# Patient Record
Sex: MALE | Race: OTHER | Employment: UNEMPLOYED | URBAN - METROPOLITAN AREA
[De-identification: names, ages, dates, MRNs, and addresses within clinical notes are randomized per-mention and may not be internally consistent; named-entity substitution may affect disease eponyms.]

---

## 2017-01-20 ENCOUNTER — OFFICE VISIT (OUTPATIENT)
Dept: FAMILY MEDICINE CLINIC | Age: 37
End: 2017-01-20

## 2017-01-20 VITALS
HEART RATE: 55 BPM | SYSTOLIC BLOOD PRESSURE: 127 MMHG | RESPIRATION RATE: 18 BRPM | WEIGHT: 212 LBS | TEMPERATURE: 97.9 F | DIASTOLIC BLOOD PRESSURE: 80 MMHG | OXYGEN SATURATION: 99 % | BODY MASS INDEX: 27.21 KG/M2 | HEIGHT: 74 IN

## 2017-01-20 DIAGNOSIS — A01.00 SALMONELLA TYPHI: ICD-10-CM

## 2017-01-20 DIAGNOSIS — R10.32 LLQ ABDOMINAL PAIN: Primary | ICD-10-CM

## 2017-01-20 DIAGNOSIS — E78.2 MIXED HYPERLIPIDEMIA: ICD-10-CM

## 2017-01-20 RX ORDER — CIPROFLOXACIN 500 MG/1
500 TABLET ORAL 2 TIMES DAILY
Qty: 20 TAB | Refills: 0 | Status: SHIPPED | OUTPATIENT
Start: 2017-01-20 | End: 2017-01-30

## 2017-01-20 RX ORDER — METRONIDAZOLE 500 MG/1
500 TABLET ORAL 3 TIMES DAILY
Qty: 30 TAB | Refills: 0 | Status: SHIPPED | OUTPATIENT
Start: 2017-01-20 | End: 2017-01-30

## 2017-01-20 NOTE — MR AVS SNAPSHOT
Visit Information Date & Time Provider Department Dept. Phone Encounter #  
 1/20/2017 10:30 AM Wayne Blanco NP [address] [facility] [phone] [id number] Follow-up Instructions Return in about 1 month (around 2/20/2017) for repeat labs and follow up. Upcoming Health Maintenance Date Due DTaP/Tdap/Td series (1 - Tdap) 8/25/1999 INFLUENZA AGE 9 TO ADULT 8/1/2016 Allergies as of 1/20/2017  Review Complete On: 1/20/2017 By: Wayne Blanco NP Severity Noted Reaction Type Reactions Codeine  04/15/2014    Nausea and Vomiting Current Immunizations  Never Reviewed No immunizations on file. Not reviewed this visit You Were Diagnosed With   
  
 Codes Comments LLQ abdominal pain    -  Primary ICD-10-CM: R10.32 
ICD-9-CM: 789.04 Vitals BP Pulse Temp Resp Height(growth percentile) Weight(growth percentile) 127/80 (BP 1 Location: Right arm, BP Patient Position: Sitting) (!) 55 97.9 °F (36.6 °C) (Oral) 18 6' 2\" (1.88 m) 212 lb (96.2 kg) SpO2 BMI Smoking Status 99% 27.22 kg/m2 Never Smoker Vitals History BMI and BSA Data Body Mass Index Body Surface Area  
 27.22 kg/m 2 2.24 m 2 Preferred Pharmacy Pharmacy Name Phone [facility] [phone] Your Updated Medication List  
  
   
This list is accurate as of: 1/20/17 11:11 AM.  Always use your most recent med list.  
  
  
  
  
 ciprofloxacin HCl 500 mg tablet Commonly known as:  CIPRO Take 1 Tab by mouth two (2) times a day for 10 days. metroNIDAZOLE 500 mg tablet Commonly known as:  FLAGYL Take 1 Tab by mouth three (3) times daily for 10 days. Prescriptions Sent to Pharmacy Refills  
 ciprofloxacin HCl (CIPRO) 500 mg tablet 0 Sig: Take 1 Tab by mouth two (2) times a day for 10 days. Class: Normal  
 Pharm[facility] [facility] [phone] Route: Oral  
 metroNIDAZOLE (FLAGYL) 500 mg tablet 0 Sig: Take 1 Tab by mouth three (3) times daily for 10 days. Class: Normal  
 Pharmacy: [facility] [address] [facility]: [phone] Route: Oral  
  
Follow-up Instructions Return in about 1 month (around 2/20/2017) for repeat labs and follow up. Patient Instructions Alimentación saludable para el corazón: Instrucciones de cuidado - [ Heart-Healthy Diet: Care Instructions ] Instrucciones de cuidado Chuckie alimentación saludable para el corazón incluye abundantes verduras, frutas, nueces, frijoles (habichuelas) y granos integrales, y es baja en sal. Limita los alimentos ricos en grasas saturadas, adan por ejemplo, mansoor, quesos y frituras. Podría resultarle difícil cambiar pires alimentación, carmen incluso los cambios pequeños pueden reducir el riesgo de ataque al corazón y enfermedades cardíacas. La atención de seguimiento es chuckie parte clave de pires tratamiento y seguridad. Asegúrese de hacer y acudir a todas las citas, y llame a pires médico si está teniendo problemas. También es chuckie buena idea saber los resultados de los exámenes y mantener chuckie lista de los medicamentos que esvin. Cómo puede cuidarse en el [facility]? [address] · Sepa qué es chuckie porción. Rosia Crest \"porción\" estándar no siempre tiene el mismo tamaño que la \"porción\" que generalmente usted come. Asegúrese de no comer porciones más grandes de las recomendadas. Por ejemplo, chuckie porción de pasta equivale a ½ taza. Chuckie porción de carne es de 2 a 3 onzas. Chuckie porción de 3 onzas es aproximadamente del tamaño de chuckie baraja de naipes.  Mida los tamaños de las porciones hasta que tenga práctica en medirlas Soosalu jony\". Tenga en cuenta que los restaurantes a menudo sirven 2 o 3 veces más cantidad que el tamaño de [place] Huger porción. · Para mantener un alto nivel de energía y no sentir hambre, coma con frecuencia carmen porciones más pequeñas. · Coma solo la cantidad de calorías que necesita para mantener un peso saludable. Si necesita bajar de [place], queme más calorías (mediante el ejercicio y otras actividades físicas) que las que consume. Consuma más frutas y verduras · Coma chuckie variedad de frutas y verduras todos los GRASSE. Hope Mar[place]opa y las verduras de color fer oscuro, naranja intenso, menezes o amarillo son particularmente buenas para usted. Lee Ann Tobin son espinacas, zanahorias, duraznos (melocotones) y bayas. · Tenga a mano zanahorias, apio y otras verduras para los refrigerios. Compre fruta de temporada y póngala donde la pueda anam para que se sienta tentado a comerla. · Prepare platos que tengan muchas verduras, adan verduras salteadas y sopas. [address] y [place]" · Moraima las etiquetas de los alimentos y trate de evitar las grasas saturadas y [place]". Aumentan el riesgo de enfermedad cardíaca. Wynelle Limb \"trans\" se encuentra en muchos alimentos procesados tales adan las galletas dulces y [place]. · Use aceite de caban o de canola (colza) al cocinar. Pruebe productos para untar que reducen [employer], adan Benecol o Take Control. · Prepare los alimentos al horno, a la lucille o al vapor, en lugar de freírlos. · Elija carne magra en vez de mansoor ricas en grasas adan los perros calientes (\"hot dogs\") y las salchichas. Al cocinar las mansoor, elimine toda la grasa visible. · Coma pescado, aves sin piel y alternativas de la carne adan productos de soya en lugar de mansoor ricas en grasas. Los productos de soya, adan el tofu, pueden ser especialmente buenos para Nayely Goldsmith. · [address] y productos lácteos bajos en grasa o sin grasa. Consuma pescado · Coma al CHILDREN'S \[facility] [place] [facility]\"" OF [place] porciones de pescado a la semana. Ciertos pescados, adan el salmón y el atún, contienen ácidos grasos omega-3, que podrían ayudar a reducir el riesgo de ataque al corazón. Coma alimentos ricos en fibra · Coma chuckie variedad de productos de granos todos los [address] Incluya alimentos de grano entero que contengan [place] y otros nutrientes. Algunos ejemplos de alimentos con granos enteros son bekah, pan integral y arroz integral. 
· Compre panes y cereales integrales en lugar de pan villafuerte o productos de pastelería. Limite la sal y [place] · Limite la cantidad de sal y sodio que consume para ayudar a reducir la presión arterial. 
· Pruebe la comida antes de ponerle sal. Agregue solo un poco de sal cuando le parezca que marc falta. Con el tiempo, ady papilas gustativas se acostumbrarán a menos sal. 
· Coma menos refrigerios, comida rápida y otros alimentos procesados ricos en sal. Revise la etiqueta de los alimentos para conocer la cantidad de sodio de los alimentos envasados. · Elija versiones bajas en sodio de alimentos enlatados (adan sopas, verduras y frijoles [habichuelas]). Limite el uso del azúcar · Limite bebidas y alimentos que contengan azúcar añadido. Entre estos se encuentran los [place], los postres y las sodas. Limite el alcohol · Limite el alcohol a no más de 2 bebidas al día para los hombres, y 1 bebida al día para las mujeres. Beber demasiado alcohol puede causarle problemas de [place]. Cuándo debe pedir ayuda? Preste especial atención a los cambios en pires vida y asegúrese de comunicarse con pires médico si: 
· Le gustaría recibir ayuda para planificar las comidas saludables para el corazón. Dónde puede encontrar más información en inglés? Ariel Camilo a [email or web address].info/. Escriba V137 en la búsqueda para aprender más acerca de \"Alimentación saludable para el corazón: Instrucciones de cuidado - [ Heart-Healthy Diet: Care Instructions ]. \" Revisado: 27 enero, 2016 Versión del contenido: 11.1 © 3781-4473 Healthwise, Incorporated. Las instrucciones de cuidado fueron adaptadas bajo licencia por [employer] (which disclaims liability or warranty for this information). Si usted tiene [place] afección médica o sobre estas instrucciones, siempre pregunte a pires profesional de vida. Healthwise, Incorporated niega toda garantía o responsabilidad por pires uso de esta información. Introducing [facility]! [employer] introduces Online Prasad patient portal. Now you can access parts of your medical record, email your doctor's office, and request medication refills online. 1. In your internet browser, go to [email or web address] 2. Click on the First Time User? Click Here link in the Sign In box. You will see the New Member Sign Up page. 3. Enter your Online Prasad Access Code exactly as it appears below. You will not need to use this code after youve completed the sign-up process. If you do not sign up before the expiration date, you must request a new code. · Online Prasad Access Code: [email or web address] Expires: 4/20/2017 10:49 AM 
 
4. Enter the last four digits of your Social Security Number (xxxx) and Date of Birth (mm/dd/yyyy) as indicated and click Submit. You will be taken to the next sign-up page. 5. Create a Online Prasad ID. This will be your Online Prasad login ID and cannot be changed, so think of one that is secure and easy to remember. 6. Create a Online Prasad password. You can change your password at any time. 7. Enter your Password Reset Question and Answer. This can be used at a later time if you forget your password. 8. Enter your e-mail address. You will receive e-mail notification when new information is available in [address] 9. Click Sign Up. You can now view and download portions of your medical record. 10. Click the Download Summary menu link to download a portable copy of your medical information. If you have questions, please visit the Frequently Asked Questions section of the [employer] website. Remember, [email or web address] is NOT to be used for urgent needs. For medical emergencies, dial 911. Now available from your iPhone and Android! Please provide this summary of care documentation to your next provider. Your primary care clinician is listed as Pati Guerra. If you have any questions after today's visit, please call [phone].

## 2017-01-20 NOTE — PATIENT INSTRUCTIONS
Alimentación saludable para el corazón: Instrucciones de cuidado - [ Heart-Healthy Diet: Care Instructions ]  Instrucciones de cuidado    Chuckie alimentación saludable para el corazón incluye abundantes verduras, frutas, nueces, frijoles (habichuelas) y granos integrales, y es baja en sal. Limita los alimentos ricos en grasas saturadas, adan por ejemplo, mansoor, quesos y frituras. Podría resultarle difícil cambiar pires alimentación, carmen incluso los cambios pequeños pueden reducir el riesgo de ataque al corazón y enfermedades cardíacas. La atención de seguimiento es chuckie parte clave de pires tratamiento y seguridad. Asegúrese de hacer y acudir a todas las citas, y llame a pires médico si está teniendo problemas. También es chuckie buena idea saber los resultados de los exámenes y mantener chuckie lista de los medicamentos que esvin. ¿Cómo puede cuidarse en el hogar? Controle las porciones  · Sepa qué es chuckie porción. [place] \"porción\" estándar no siempre tiene el mismo tamaño que la \"porción\" que generalmente usted come. Asegúrese de no comer porciones más grandes de las recomendadas. Por ejemplo, chuckie porción de pasta equivale a ½ taza. Chuckie porción de carne es de 2 a 3 onzas. Chuckie porción de 3 onzas es aproximadamente del tamaño de chuckie baraja de naipes. Mida los tamaños de las porciones hasta que tenga práctica en medirlas \"a jony\". Tenga en cuenta que los restaurantes a menudo sirven 2 o 3 veces más cantidad que el tamaño de [place] porción. · Para mantener un alto nivel de energía y no sentir hambre, coma con frecuencia carmen porciones más pequeñas. · Coma solo la cantidad de calorías que necesita para mantener un peso saludable. Si necesita bajar de [place], queme más calorías (mediante el ejercicio y otras actividades físicas) que las que consume. Consuma más frutas y verduras  · Coma chuckie variedad de frutas y verduras todos los belen.  Jaz Starch y las verduras de color fer oscuro, naranja intenso, menezes o amarillo son particularmente buenas para usted. Buhl Bogdan son espinacas, zanahorias, duraznos (melocotones) y bayas. · Tenga a mano zanahorias, apio y otras verduras para los refrigerios. Compre fruta de temporada y póngala donde la pueda anam para que se sienta tentado a comerla. · Prepare platos que tengan muchas verduras, adan verduras salteadas y sopas. Limite las grasas saturadas y \"trans\"  · [address] de los alimentos y trate de evitar las grasas saturadas y [place]". Aumentan el riesgo de enfermedad cardíaca. [place] Councilman \"trans\" se encuentra en muchos alimentos procesados tales adan las galletas dulces y [place]. · Use aceite de caban o de canola (colza) al cocinar. Pruebe productos para untar que reducen [employer], adan Benecol o Take Control. · Prepare los alimentos al horno, a la lucille o al vapor, en lugar de freírlos. · Elija carne magra en vez de mansoor ricas en grasas adan los perros calientes (\"hot dogs\") y las salchichas. Al cocinar las mansoor, elimine toda la grasa visible. · Coma pescado, aves sin piel y alternativas de la carne adan productos de soya en lugar de mansoor ricas en grasas. Los productos de soya, adan el tofu, pueden ser especialmente buenos para Gurmeet Bills. · Sisi Punches y productos lácteos bajos en grasa o sin grasa. Consuma pescado  · Coma al [facility] porciones de pescado a la semana. Ciertos pescados, adan el salmón y el atún, contienen ácidos grasos omega-3, que podrían ayudar a reducir el riesgo de ataque al corazón. Coma alimentos ricos en fibra  · Coma chuckie variedad de productos de granos todos los días. Incluya alimentos de grano entero que contengan [place] y otros nutrientes. Algunos ejemplos de alimentos con granos enteros son bekah, pan integral y arroz integral.  · Compre panes y cereales integrales en lugar de pan villafuerte o productos de pastelería.   Limite la sal y el sodio  · Limite la cantidad de sal y sodio que consume para ayudar a reducir la presión arterial.  · Pruebe la comida antes de ponerle sal. Agregue solo un poco de sal cuando le parezca que marc falta. Con el tiempo, ady papilas gustativas se acostumbrarán a menos sal.  · Coma menos refrigerios, comida rápida y otros alimentos procesados ricos en sal. Revise la etiqueta de los alimentos para conocer la cantidad de sodio de los alimentos envasados. · Elija versiones bajas en sodio de alimentos enlatados (adan sopas, verduras y frijoles [habichuelas]). Limite el uso del azúcar  · Limite bebidas y alimentos que contengan azúcar añadido. Entre estos se encuentran los [place], los postres y las sodas. Limite el alcohol  · Limite el alcohol a no más de 2 bebidas al día para los hombres, y 1 bebida al día para las mujeres. Beber demasiado alcohol puede causarle problemas de [place]. ¿Cuándo debe pedir ayuda? Preste especial atención a los cambios en pires vida y asegúrese de comunicarse con pires médico si:  · Le gustaría recibir ayuda para planificar las comidas saludables para el corazón. ¿Dónde puede encontrar más información en inglés? Linda Cast a [email or web address].info/. Escriba V137 en la búsqueda para aprender más acerca de \"Alimentación saludable para el corazón: Instrucciones de cuidado - [ Heart-Healthy Diet: Care Instructions ]. \"  Revisado: 27 enero, 2016  Versión del contenido: 11.1  © 5207-1775 [employer]. Las instrucciones de cuidado fueron adaptadas bajo licencia por [employer] (which disclaims liability or warranty for this information). Si usted tiene [place] afección médica o sobre estas instrucciones, siempre pregunte a pires profesional de vida. [facility] [employer] niega toda garantía o responsabilidad por pires uso de esta información.

## 2017-01-20 NOTE — PROGRESS NOTES
Chief Complaint   Patient presents with    Physical    Labs     Patient in office today for physical and fasting labs; Have concerns regarding trip from [address] that was taken one month ago; states have abdominal pain that has not resolved. 1. Have you been to the ER, urgent care clinic since your last visit? Hospitalized since your last visit? No    2. Have you seen or consulted any other health care providers outside of the [address] [place] [address] since your last visit? Include any pap smears or colon screening. No    Pt previously prescribed Linzess which helped relieve his constipation. Denies any constipation since then. Pt had hernia surgery about 3 years ago. Pain has been present since before having his hernia surgery. Denies any blood in the stool. Has stool culture results from [place] that suggest he could have a stool infection but [facility] [place] Comparison with research online suggests he could have salmonella typhi infection. Will scan results into media. Pt also had fasting labs while in [facility] and only abnormality was his cholesterol. Blood TGs were 577 fasting. Total cholesterol was 221. LDL 73.     Last colonoscopy was more than 5 years ago. Denies any other concerns at this time. Chief Complaint   Patient presents with   Lian Mancia     he is a 43y.o. year old male who presents for evalution. Reviewed PmHx, RxHx, FmHx, SocHx, AllgHx and updated and dated in the chart.     Review of Systems - negative except as listed above in the HPI    Objective:     Vitals:    01/20/17 1042   BP: 127/80   Pulse: (!) 55   Resp: 18   Temp: 97.9 °F (36.6 °C)   TempSrc: Oral   SpO2: 99%   Weight: 212 lb (96.2 kg)   Height: 6' 2\" (1.88 m)     Physical Examination: General appearance - alert, well appearing, and in no distress  Mental status - normal mood  Eyes - pupils equal and reactive, extraocular eye movements intact  Ears - bilateral TM's and external ear canals normal  Nose - normal and patent, no erythema, discharge or polyps and normal nontender sinuses  Mouth - mucous membranes moist, pharynx normal without lesions  Neck - supple, no significant adenopathy, carotids upstroke normal bilaterally, no bruits, thyroid exam: thyroid is normal in size without nodules or tenderness  Chest - clear to auscultation, no wheezes, rales or rhonchi, symmetric air entry  Heart - normal rate, regular rhythm, normal S1, S2, no murmurs  Abdomen - soft, non-distended, reports pain with deep palpation of the LLQ with no visible or palpable abnormality  Extremities - peripheral pulses normal, no ankle edema, no clubbing or cyanosis  Skin - normal coloration and turgor, no rashes, no suspicious skin lesions noted    Assessment/ Plan:   Soo Uriarte was seen today for physical and labs. Diagnoses and all orders for this visit:    LLQ abdominal pain / Salmonella Typhi  -     ciprofloxacin HCl (CIPRO) 500 mg tablet; Take 1 Tab by mouth two (2) times a day for 10 days. -     metroNIDAZOLE (FLAGYL) 500 mg tablet; Take 1 Tab by mouth three (3) times daily for 10 days. Start med regimen and take as prescribed. Reviewed SEs/ADSR of medication. Follow up in 1 month for reevaluation of sx. Consider repeating stool cultures. If sx persist or worsen pt will need to see GI for further evaluation. Mixed hyperlipidemia  Recommended pt start with making some diet and lifestyle changes provided with resources. Follow up in 1 month to repeat fasting labs. Discussed that if his TGs dont come down, will need to start new daily med. Follow-up Disposition:  Return in about 1 month (around 2/20/2017) for repeat labs and follow up. I have discussed the diagnosis with the patient and the intended plan as seen in the above orders. The patient has received an after-visit summary and questions were answered concerning future plans.      Medication Side Effects and Warnings were discussed with patient: yes  Patient Labs were reviewed and or requested: yes  Patient Past Records were reviewed and or requested  yes  Patient / Caregiver Understanding of treatment plan was verbalized during office visit YES    KANE Flores    Patient Instructions        Alimentación saludable para el corazón: Instrucciones de cuidado - [ Heart-Healthy Diet: Care Instructions ]  Instrucciones de cuidado    Chuckie alimentación saludable para el corazón incluye abundantes verduras, frutas, nueces, frijoles (habichuelas) y granos integrales, y es baja en sal. Limita los alimentos ricos en grasas saturadas, adan por ejemplo, mansoor, quesos y frituras. Podría resultarle difícil cambiar pires alimentación, carmen incluso los cambios pequeños pueden reducir el riesgo de ataque al corazón y enfermedades cardíacas. La atención de seguimiento es chuckie parte clave de pires tratamiento y seguridad. Asegúrese de hacer y acudir a todas las citas, y llame a pires médico si está teniendo problemas. También es chuckie buena idea saber los resultados de los exámenes y mantener chuckie lista de los medicamentos que esvin. ¿Cómo puede cuidarse en el hogar? Controle las porciones  · Sepa qué es chuckie porción. Go Riling \"porción\" estándar no siempre tiene el mismo tamaño que la \"porción\" que generalmente usted come. Asegúrese de no comer porciones más grandes de las recomendadas. Por ejemplo, chuckie porción de pasta equivale a ½ taza. Chuckie porción de carne es de 2 a 3 onzas. Chuckie porción de 3 onzas es aproximadamente del tamaño de chuckie baraja de naipes. Mida los tamaños de las porciones hasta que tenga práctica en medirlas \"a jony\". Tenga en cuenta que los restaurantes a menudo sirven 2 o 3 veces más cantidad que el tamaño de Manuelita Darcy porción. · Para mantener un alto nivel de energía y no sentir hambre, coma con frecuencia carmen porciones más pequeñas. · Coma solo la cantidad de calorías que necesita para mantener un peso saludable.  Si necesita bajar de [place], queme más calorías ([place] ejercicio y [address] físicas) que las que consume. Consuma más frutas y verduras  · Coma chuckie variedad de frutas y verduras todos los belen. Sophia Colonel y las verduras de color fer oscuro, naranja intenso, menezes o amarillo son particularmente buenas para usted. Woody [place] son espinacas, zanahorias, duraznos (melocotones) y bayas. · Tenga a mano zanahorias, apio y otras verduras para los refrigerios. Compre fruta de temporada y póngala donde la pueda anam para que se sienta tentado a comerla. · Prepare platos que tengan muchas verduras, adan verduras salteadas y sopas. Limite las grasas saturadas y \"trans\"  · [address] de los alimentos y trate de evitar las grasas saturadas y Moe". Aumentan el riesgo de enfermedad cardíaca. Ivory Brine \"trans\" se encuentra en muchos alimentos procesados tales adan las galletas dulces y [place]. · Use aceite de caban o de canola (colza) al cocinar. Pruebe productos para untar que reducen [employer], adan Benecol o Take Control. · Prepare los alimentos al horno, a la lucille o al vapor, en lugar de freírlos. · Elija carne magra en vez de mansoor ricas en grasas adan los perros calientes (\"hot dogs\") y las salchichas. Al cocinar las mansoor, elimine toda la grasa visible. · Coma pescado, aves sin piel y alternativas de la carne adan productos de soya en lugar de mansoor ricas en grasas. Los productos de soya, adan el tofu, pueden ser especialmente buenos para Carole Cord. · [address] y productos lácteos bajos en grasa o sin grasa. Consuma pescado  · Coma al [place] porciones de pescado a la semana. Ciertos pescados, adan el salmón y el atún, contienen ácidos grasos omega-3, que podrían ayudar a reducir el riesgo de ataque al corazón. Coma alimentos ricos en fibra  · Coma chuckie variedad de productos de granos todos los días. Incluya alimentos de grano entero que contengan [place] y otros nutrientes.  Bong abebe alimentos con granos enteros son bekah, pan integral y arroz integral.  · Compre panes y cereales integrales en lugar de pan villafuerte o productos de pastelería. Limite la sal y el sodio  · Limite la cantidad de sal y sodio que consume para ayudar a reducir la presión arterial.  · Pruebe la comida antes de ponerle sal. Agregue solo un poco de sal cuando le parezca que marc falta. Con el tiempo, ady papilas gustativas se acostumbrarán a menos sal.  · Coma menos refrigerios, comida rápida y otros alimentos procesados ricos en sal. Revise la etiqueta de los alimentos para conocer la cantidad de sodio de los alimentos envasados. · Elija versiones bajas en sodio de alimentos enlatados (adan sopas, verduras y frijoles [habichuelas]). Limite el uso del azúcar  · Limite bebidas y alimentos que contengan azúcar añadido. Entre estos se encuentran los [place], los postres y las sodas. Limite el alcohol  · Limite el alcohol a no más de 2 bebidas al día para los hombres, y 1 bebida al día para las mujeres. Beber demasiado alcohol puede causarle problemas de [place]. ¿Cuándo debe pedir ayuda? Preste especial atención a los cambios en pires viad y asegúrese de comunicarse con pires médico si:  · Le gustaría recibir ayuda para planificar las comidas saludables para el corazón. ¿Dónde puede encontrar más información en inglés? Saul Quintana a http://suma-huong.info/. Escriba V137 en la búsqueda para aprender más acerca de \"Alimentación saludable para el corazón: Instrucciones de cuidado - [ Heart-Healthy Diet: Care Instructions ]. \"  Revisado: 27 enero, 2016  Versión del contenido: 11.1  © 3618-9035 [employer], [employer] Las instrucciones de cuidado fueron adaptadas bajo licencia por [employer] (which disclaims liability or warranty for this information). Si usted tiene [place] afección médica o sobre estas instrucciones, siempre pregunte a pires profesional de vida.  [employer] niega toda garantía o responsabilidad por pires uso de esta información.

## 2017-01-20 NOTE — PROGRESS NOTES
Chief Complaint   Patient presents with   Lian Mancia     Patient in office today for physical and fasting labs; have concerns regarding trip from [address] that was taken one month ago;states have abdominal pain that has not resolved. 1. Have you been to the ER, urgent care clinic since your last visit? Hospitalized since your last visit? No    2. Have you seen or consulted any other health care providers outside of the [facility] [place] since your last visit? Include any pap smears or colon screening.  No

## 2017-02-20 ENCOUNTER — OFFICE VISIT (OUTPATIENT)
Dept: FAMILY MEDICINE CLINIC | Age: 37
End: 2017-02-20

## 2017-02-20 VITALS
SYSTOLIC BLOOD PRESSURE: 130 MMHG | BODY MASS INDEX: 26.31 KG/M2 | HEART RATE: 60 BPM | HEIGHT: 74 IN | OXYGEN SATURATION: 98 % | WEIGHT: 205 LBS | DIASTOLIC BLOOD PRESSURE: 84 MMHG | RESPIRATION RATE: 18 BRPM | TEMPERATURE: 98.6 F

## 2017-02-20 DIAGNOSIS — Z87.19 HISTORY OF INGUINAL HERNIA REPAIR: ICD-10-CM

## 2017-02-20 DIAGNOSIS — Z98.890 HISTORY OF INGUINAL HERNIA REPAIR: ICD-10-CM

## 2017-02-20 DIAGNOSIS — R10.32 LEFT INGUINAL PAIN: Primary | ICD-10-CM

## 2017-02-20 DIAGNOSIS — E78.2 MIXED HYPERLIPIDEMIA: ICD-10-CM

## 2017-02-20 NOTE — PROGRESS NOTES
Chief Complaint   Patient presents with    Abdominal Pain     Patient in office today for one month f/u of LLQ abdominal pain / Salmonella Typhi; have completed abx. States pain has not resolved. 1. Have you been to the ER, urgent care clinic since your last visit? Hospitalized since your last visit? No    2. Have you seen or consulted any other health care providers outside of the [address] [place] [address] since your last visit? Include any pap smears or colon screening. No    Pain comes and goes. Worse after working a lot. Hernia surgery was 3 years ago. Denies any relief of sx with completing the abx as prescribed. Pt has previously seen GI and urology. Both specialists told pt that things looked normal.     Denies any other concerns at this time. Chief Complaint   Patient presents with    Abdominal Pain     he is a 43y.o. year old male who presents for evalution. Reviewed PmHx, RxHx, FmHx, SocHx, AllgHx and updated and dated in the chart. Review of Systems - negative except as listed above in the HPI    Objective:     Vitals:    02/20/17 0936   BP: 130/84   Pulse: 60   Resp: 18   Temp: 98.6 °F (37 °C)   TempSrc: Oral   SpO2: 98%   Weight: 205 lb (93 kg)   Height: 6' 2\" (1.88 m)     Physical Examination: General appearance - alert, well appearing, and in no distress  Neck - carotids upstroke normal bilaterally, no bruits  Chest - clear to auscultation, no wheezes, rales or rhonchi, symmetric air entry  Heart - normal rate, regular rhythm, normal S1, S2, no murmurs    Assessment/ Plan:   Edson Campos was seen today for abdominal pain. Diagnoses and all orders for this visit:    Left inguinal pain / History of inguinal hernia repair  -     US SCROTUM/TESTICLES; Future  -     REFERRAL TO UROLOGY  US for further evaluation of sx. Will notify results and deviate plan based on findings. Enc pt to re-est care with urology for further evaluation of chronic sx.  Work up to date has been normal and sx were unrelieved by abx regimen. Mixed hyperlipidemia  -     LIPID PANEL  -     METABOLIC PANEL, COMPREHENSIVE  Will notify results and deviate plan based on findings. Enc to continue with efforts to follow a heart healthy diet. Follow-up Disposition:  Return if symptoms worsen or fail to improve. I have discussed the diagnosis with the patient and the intended plan as seen in the above orders. The patient has received an after-visit summary and questions were answered concerning future plans. Medication Side Effects and Warnings were discussed with patient: yes  Patient Labs were reviewed and or requested: yes  Patient Past Records were reviewed and or requested  yes  Patient / Caregiver Understanding of treatment plan was verbalized during office visit YES    KANE Parrish    There are no Patient Instructions on file for this visit.

## 2017-02-20 NOTE — PROGRESS NOTES
Chief Complaint   Patient presents with    Abdominal Pain     Patient in office today for one month f/u of LLQ abdominal pain / Salmonella Typhi; have completed abx. States pain has not resolved. 1. Have you been to the ER, urgent care clinic since your last visit? Hospitalized since your last visit? No    2. Have you seen or consulted any other health care providers outside of the [address] [place] [address] since your last visit? Include any pap smears or colon screening.  No

## 2017-02-20 NOTE — MR AVS SNAPSHOT
Visit Information Date & Time Provider Department Dept. Phone Encounter #  
 2/20/2017  9:30 AM Doris Davidson NP [facility] [phone] [id number] Follow-up Instructions Return if symptoms worsen or fail to improve. Upcoming Health Maintenance Date Due DTaP/Tdap/Td series (1 - Tdap) 8/25/1999 INFLUENZA AGE 9 TO ADULT 8/1/2016 Allergies as of 2/20/2017  Review Complete On: 2/20/2017 By: Doris Davidson NP Severity Noted Reaction Type Reactions Codeine  04/15/2014    Nausea and Vomiting Current Immunizations  Never Reviewed No immunizations on file. Not reviewed this visit You Were Diagnosed With   
  
 Codes Comments Left inguinal pain    -  Primary ICD-10-CM: R10.30 ICD-9-CM: 789.09 History of inguinal hernia repair     ICD-10-CM: Z98.890, Z87.19 ICD-9-CM: V15.29 Mixed hyperlipidemia     ICD-10-CM: E78.2 ICD-9-CM: 272.2 Vitals BP Pulse Temp Resp Height(growth percentile) Weight(growth percentile) 130/84 (BP 1 Location: Right arm, BP Patient Position: Sitting) 60 98.6 °F (37 °C) (Oral) 18 6' 2\" (1.88 m) 205 lb (93 kg) SpO2 BMI Smoking Status 98% 26.32 kg/m2 Never Smoker BMI and BSA Data Body Mass Index Body Surface Area  
 26.32 kg/m 2 2.2 m 2 Preferred Pharmacy Pharmacy Name Phone [address] [phone] Your Updated Medication List  
  
Notice  As of 2/20/2017 10:03 AM  
 You have not been prescribed any medications. We Performed the Following LIPID PANEL [11519 CPT(R)] METABOLIC PANEL, COMPREHENSIVE [30085 CPT(R)] REFERRAL TO UROLOGY [BHV655 Custom] Follow-up Instructions Return if symptoms worsen or fail to improve. To-Do List   
 02/21/2017 Imaging:  US SCROTUM/TESTICLES Referral Information Referral ID Referred By Referred To  
  
 [id number] Jeremias Sanchez MD   
   [address]   
   [place], [address] Phone: [phone] Fax: [phone] Visits Status Start Date End Date 1 New Request 2/20/17 2/20/18 If your referral has a status of pending review or denied, additional information will be sent to support the outcome of this decision. Introducing [facility]! Pola Riley introduces [employer] patient portal. Now you can access parts of your medical record, email your doctor's office, and request medication refills online. 1. In your internet browser, go to [email or web address] 2. Click on the First Time User? Click Here link in the Sign In box. You will see the New Member Sign Up page. 3. Enter your [employer] Access Code exactly as it appears below. You will not need to use this code after youve completed the sign-up process. If you do not sign up before the expiration date, you must request a new code. · [employer] Access Code: [email or web address] Expires: 4/20/2017 10:49 AM 
 
4. Enter the last four digits of your Social Security Number (xxxx) and Date of Birth (mm/dd/yyyy) as indicated and click Submit. You will be taken to the next sign-up page. 5. Create a [employer] ID. This will be your [employer] login ID and cannot be changed, so think of one that is secure and easy to remember. 6. Create a [employer] password. You can change your password at any time. 7. Enter your Password Reset Question and Answer. This can be used at a later time if you forget your password. 8. Enter your e-mail address. You will receive e-mail notification when new information is available in [address] 9. Click Sign Up. You can now view and download portions of your medical record. 10. Click the Download Summary menu link to download a portable copy of your medical information. If you have questions, please visit the Frequently Asked Questions section of the [employer] website. Remember, [email or web address] is NOT to be used for urgent needs. For medical emergencies, dial 911. Now available from your iPhone and Android! Please provide this summary of care documentation to your next provider. Your primary care clinician is listed as Naveen Llnaos. If you have any questions after today's visit, please call [phone].

## 2017-02-21 LAB
ALBUMIN SERPL-MCNC: 4.5 G/DL (ref 3.5–5.5)
ALBUMIN/GLOB SERPL: 2.1 {RATIO} (ref 1.1–2.5)
ALP SERPL-CCNC: 85 IU/L (ref 39–117)
ALT SERPL-CCNC: 33 IU/L (ref 0–44)
AST SERPL-CCNC: 25 IU/L (ref 0–40)
BILIRUB SERPL-MCNC: 0.4 MG/DL (ref 0–1.2)
BUN SERPL-MCNC: 17 MG/DL (ref 6–20)
BUN/CREAT SERPL: 22 (ref 8–19)
CALCIUM SERPL-MCNC: 9.1 MG/DL (ref 8.7–10.2)
CHLORIDE SERPL-SCNC: 103 MMOL/L (ref 96–106)
CHOLEST SERPL-MCNC: 159 MG/DL (ref 100–199)
CO2 SERPL-SCNC: 23 MMOL/L (ref 18–29)
CREAT SERPL-MCNC: 0.79 MG/DL (ref 0.76–1.27)
GLOBULIN SER CALC-MCNC: 2.1 G/DL (ref 1.5–4.5)
GLUCOSE SERPL-MCNC: 85 MG/DL (ref 65–99)
HDLC SERPL-MCNC: 33 MG/DL
INTERPRETATION, 910389: NORMAL
LDLC SERPL CALC-MCNC: 106 MG/DL (ref 0–99)
POTASSIUM SERPL-SCNC: 4.2 MMOL/L (ref 3.5–5.2)
PROT SERPL-MCNC: 6.6 G/DL (ref 6–8.5)
SODIUM SERPL-SCNC: 141 MMOL/L (ref 134–144)
TRIGL SERPL-MCNC: 99 MG/DL (ref 0–149)
VLDLC SERPL CALC-MCNC: 20 MG/DL (ref 5–40)

## 2017-02-21 NOTE — PROGRESS NOTES
Please notify pt the followin. Cholesterol looks great. Enc pt to keep following a heart healthy diet. Try eating a handful of almonds daily to bring up HDL. 2. Normal glucose, kidney and liver function. All other labs are normal. I recommend we repeat fasting labs in 1 year.

## 2017-02-22 ENCOUNTER — HOSPITAL ENCOUNTER (OUTPATIENT)
Dept: ULTRASOUND IMAGING | Age: 37
Discharge: HOME | End: 2017-02-22
Payer: COMMERCIAL

## 2017-02-22 DIAGNOSIS — R10.32 LEFT INGUINAL PAIN: ICD-10-CM

## 2017-02-22 PROCEDURE — 76870 US EXAM SCROTUM: CPT

## 2017-02-23 NOTE — PROGRESS NOTES
Please notify pt that US is normal and shows evidence of inguinal hernia. I recommend he follow up with urology if sx persist or worsen.

## 2017-02-26 ENCOUNTER — APPOINTMENT (OUTPATIENT)
Dept: GENERAL RADIOLOGY | Age: 37
End: 2017-02-26
Attending: EMERGENCY MEDICINE
Payer: COMMERCIAL

## 2017-02-26 ENCOUNTER — HOSPITAL ENCOUNTER (EMERGENCY)
Age: 37
Discharge: HOME | End: 2017-02-26
Attending: EMERGENCY MEDICINE
Payer: COMMERCIAL

## 2017-02-26 VITALS
RESPIRATION RATE: 16 BRPM | OXYGEN SATURATION: 97 % | SYSTOLIC BLOOD PRESSURE: 121 MMHG | TEMPERATURE: 98.6 F | WEIGHT: 205 LBS | HEIGHT: 74 IN | BODY MASS INDEX: 26.31 KG/M2 | HEART RATE: 58 BPM | DIASTOLIC BLOOD PRESSURE: 74 MMHG

## 2017-02-26 DIAGNOSIS — M54.50 ACUTE LEFT-SIDED LOW BACK PAIN WITHOUT SCIATICA: Primary | ICD-10-CM

## 2017-02-26 LAB
APPEARANCE UR: ABNORMAL
BACTERIA URNS QL MICRO: NEGATIVE /HPF
BILIRUB UR QL: NEGATIVE
COLOR UR: ABNORMAL
EPITH CASTS URNS QL MICRO: ABNORMAL /LPF
GLUCOSE UR STRIP.AUTO-MCNC: NEGATIVE MG/DL
HGB UR QL STRIP: NEGATIVE
HYALINE CASTS URNS QL MICRO: ABNORMAL /LPF (ref 0–5)
KETONES UR QL STRIP.AUTO: NEGATIVE MG/DL
LEUKOCYTE ESTERASE UR QL STRIP.AUTO: NEGATIVE
NITRITE UR QL STRIP.AUTO: NEGATIVE
PH UR STRIP: 7 [PH] (ref 5–8)
PROT UR STRIP-MCNC: NEGATIVE MG/DL
RBC #/AREA URNS HPF: ABNORMAL /HPF (ref 0–5)
SP GR UR REFRACTOMETRY: 1.02 (ref 1–1.03)
UROBILINOGEN UR QL STRIP.AUTO: 0.2 EU/DL (ref 0.2–1)
WBC URNS QL MICRO: ABNORMAL /HPF (ref 0–4)

## 2017-02-26 PROCEDURE — 81001 URINALYSIS AUTO W/SCOPE: CPT | Performed by: NURSE PRACTITIONER

## 2017-02-26 PROCEDURE — 72100 X-RAY EXAM L-S SPINE 2/3 VWS: CPT

## 2017-02-26 PROCEDURE — 99283 EMERGENCY DEPT VISIT LOW MDM: CPT

## 2017-02-26 RX ORDER — CYCLOBENZAPRINE HCL 10 MG
10 TABLET ORAL
Qty: 15 TAB | Refills: 0 | Status: SHIPPED | OUTPATIENT
Start: 2017-02-26 | End: 2017-04-06

## 2017-02-26 RX ORDER — IBUPROFEN 800 MG/1
800 TABLET ORAL
Qty: 15 TAB | Refills: 0 | Status: SHIPPED | OUTPATIENT
Start: 2017-02-26 | End: 2017-04-06

## 2017-02-26 NOTE — ED NOTES
Discharge instructions given to patient by MD and nurse. Pt has been given counseling on medication use and verbalizes understanding. Pt ambulated off of unit in no signs of distress.

## 2017-02-27 NOTE — ED PROVIDER NOTES
HPI Comments: 42 yo M with hx of GERD, hernia presents ambulatory to the ED with c/o L lower back pain since Friday. 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 Denies radiation of pain , loss of control of bowel or bladder, weakness, numbness or tingling of lower extremities. The pt reports increase in urination but denies dysuria or hematuria. Denies fever, chills, nausea, vomiting or diarrhea, CP or SOB. Tx PTA includes Tylenol. Past Medical History:  No date: GERD (gastroesophageal reflux disease)      Comment: rarely    Social History    Marital status:              Spouse name:                       Years of education:                 Number of children:               Occupational History    None on file    Social History Main Topics    Smoking status: Never Smoker                                                                Smokeless status: Never Used                        Alcohol use: No              Drug use: No              Sexual activity: Yes                  Other Topics            Concern    None on file    Social History Narrative    None on file          Patient is a 43 y.o. male presenting with back pain. Back Pain    Pertinent negatives include no chest pain, no fever, no numbness, no headaches, no abdominal pain, no dysuria and no weakness. Past Medical History:   Diagnosis Date    GERD (gastroesophageal reflux disease)     rarely       Past Surgical History:   Procedure Laterality Date    HX HERNIA REPAIR      HX ORTHOPAEDIC Right 2000    right wrist surgery         History reviewed. No pertinent family history. Social History     Social History    Marital status:      Spouse name: N/A    Number of children: N/A    Years of education: N/A     Occupational History    Not on file.      Social History Main Topics    Smoking status: Never Smoker    Smokeless tobacco: Never Used    Alcohol use No    Drug use: No    Sexual activity: Yes     Other Topics Concern    Not on file     Social History Narrative         ALLERGIES: Codeine    Review of Systems   Constitutional: Negative for activity change, appetite change, chills and fever. HENT: Negative for ear discharge and facial swelling. Eyes: Negative for discharge and redness. Respiratory: Negative for chest tightness, shortness of breath and wheezing. Cardiovascular: Negative for chest pain, palpitations and leg swelling. Gastrointestinal: Negative for abdominal pain, diarrhea, nausea, rectal pain and vomiting. Genitourinary: Positive for frequency. Negative for difficulty urinating, dysuria, hematuria and urgency. Musculoskeletal: Positive for back pain. Negative for joint swelling, neck pain and neck stiffness. Skin: Negative for rash. Neurological: Negative for dizziness, seizures, speech difficulty, weakness, light-headedness, numbness and headaches. Psychiatric/Behavioral: Negative for confusion. Vitals:    02/26/17 1226 02/26/17 1522   BP: (!) 149/99 121/74   Pulse: 65 (!) 58   Resp: 16 16   Temp: 98.4 °F (36.9 °C) 98.6 °F (37 °C)   SpO2: 99% 97%   Weight: 93 kg (205 lb)    Height: 6' 2\" (1.88 m)             Physical Exam   Constitutional: He is oriented to person, place, and time. He appears well-developed and well-nourished. No distress. HENT:   Head: Normocephalic and atraumatic. Eyes: Conjunctivae and EOM are normal. Pupils are equal, round, and reactive to light. Neck: Normal range of motion. Neck supple. Cardiovascular: Normal rate, regular rhythm, normal heart sounds and intact distal pulses. Pulmonary/Chest: Effort normal and breath sounds normal. No accessory muscle usage. No tachypnea. He has no decreased breath sounds. He has no wheezes. B breath sounds CTA. No evidence of SOB. Abdominal: Soft. Bowel sounds are normal. He exhibits no distension and no mass. There is no hepatosplenomegaly. There is no tenderness. There is no rigidity, no rebound, no guarding and no CVA tenderness. Abdomen soft, nontender with active BS throughout. No rigidity, masses or guarding. No flank or CVA discomfort. Genitourinary:   Genitourinary Comments: Urinary frequency. Denies hematuria, dysuria. Musculoskeletal: Normal range of motion. He exhibits tenderness. He exhibits no deformity. Tenderness to palpation of L lower lumbar region with no tenderness over SI joint. No numbness, weakness or paresthesias. 5/5 strength of lower extremities, +2 DP B. No decrease in sensation or ROM. No TLS spinous process tenderness or deformity. Neurological: He is alert and oriented to person, place, and time. He has normal strength. He displays no atrophy. No cranial nerve deficit or sensory deficit. He exhibits normal muscle tone. Coordination and gait normal. GCS eye subscore is 4. GCS verbal subscore is 5. GCS motor subscore is 6. Skin: Skin is warm and dry. Psychiatric: He has a normal mood and affect. His behavior is normal. Judgment and thought content normal.   Nursing note and vitals reviewed. MDM  Number of Diagnoses or Management Options  Acute left-sided low back pain without sciatica:   Diagnosis management comments: 42 yo M,  with L sided LBP since Friday. + urinary frequency. Denies radiation of pain. No numbness, tingling or weakness. LS spine Xray and UA ordered. Xray and UA reassuring. Will discharge pt with medication for pain, spasm and recommend FU with PCP, provider information given at the time of discharge.          Amount and/or Complexity of Data Reviewed  Clinical lab tests: ordered and reviewed  Tests in the radiology section of CPT®: ordered and reviewed    Patient Progress  Patient progress: stable    ED Course       Procedures

## 2017-03-18 NOTE — ED TRIAGE NOTES
Pt reports left lower back pain that began this past Friday. Pt denies fall or injury. Pt works construction.
- - -

## 2017-04-06 ENCOUNTER — OFFICE VISIT (OUTPATIENT)
Dept: FAMILY MEDICINE CLINIC | Age: 37
End: 2017-04-06

## 2017-04-06 VITALS
RESPIRATION RATE: 18 BRPM | WEIGHT: 206 LBS | TEMPERATURE: 98 F | HEART RATE: 72 BPM | BODY MASS INDEX: 26.44 KG/M2 | HEIGHT: 74 IN | OXYGEN SATURATION: 98 % | DIASTOLIC BLOOD PRESSURE: 74 MMHG | SYSTOLIC BLOOD PRESSURE: 108 MMHG

## 2017-04-06 DIAGNOSIS — K58.9 IRRITABLE BOWEL SYNDROME, UNSPECIFIED TYPE: ICD-10-CM

## 2017-04-06 DIAGNOSIS — M54.5 ACUTE LOW BACK PAIN, UNSPECIFIED BACK PAIN LATERALITY, WITH SCIATICA PRESENCE UNSPECIFIED: Primary | ICD-10-CM

## 2017-04-06 DIAGNOSIS — R10.9 CHRONIC ABDOMINAL PAIN: ICD-10-CM

## 2017-04-06 DIAGNOSIS — G89.29 CHRONIC ABDOMINAL PAIN: ICD-10-CM

## 2017-04-06 RX ORDER — DICLOFENAC SODIUM 75 MG/1
75 TABLET, DELAYED RELEASE ORAL 2 TIMES DAILY
Qty: 60 TAB | Refills: 2 | Status: SHIPPED | OUTPATIENT
Start: 2017-04-06 | End: 2017-10-30

## 2017-04-06 NOTE — PROGRESS NOTES
Chief Complaint   Patient presents with   [facility] Follow Up     Patient in office today for hosp f/u; pt was seen at [facility] due to back pain that has gotten worse in the past month; pt states he has f/u with chiropractor and was told to use ice compresses. 1. Have you been to the ER, urgent care clinic since your last visit? Hospitalized since your last visit?see chart    2. Have you seen or consulted any other health care providers outside of the [address] [place] [address] since your last visit? Include any pap smears or colon screening.  No

## 2017-04-06 NOTE — PROGRESS NOTES
Chief Complaint   Patient presents with   [facility] Follow Up     Patient in office today for hosp f/u; pt was seen at [facility] due to back pain that has gotten worse in the past month; pt states he has f/u with chiropractor and was told to use ice compresses. 1. Have you been to the ER, urgent care clinic since your last visit? Hospitalized since your last visit?see chart    2. Have you seen or consulted any other health care providers outside of the [employer] since your last visit? Include any pap smears or colon screening. No    Pts xray done during ED visit was normal.   Pt was prescribed flexeril and 800 mg motrin for pain. Didn't seem to help the pain. Pt noticed back pain after getting up out of work one morning. Noticed while driving to work that the pain worsened and spread down the left leg. Had a hard time working that day. Tried to work through it but was not able. Denies any history of trauma or injury prior to pain starting. Denies any history of back pain or problems. Still c/o pain at times. Not as severe. More problematic when he starts driving and from being in that position. Denies any OTCs. Pt has been seeing a chiropractor. Urology did a CT of his abdomen that showed 2 bulging discs in the low back. Still having pain with defecation and having a hard time going to the bathroom. Denies any blood in the stool. Only itching after he stools. Denies any other concerns at this time. Chief Complaint   Patient presents with   [facility] Follow Up     he is a 43y.o. year old male who presents for evalution. Reviewed PmHx, RxHx, FmHx, SocHx, AllgHx and updated and dated in the chart.     Review of Systems - negative except as listed above in the HPI    Objective:     Vitals:    04/06/17 1520   BP: 108/74   Pulse: 72   Resp: 18   Temp: 98 °F (36.7 °C)   TempSrc: Oral   SpO2: 98%   Weight: 206 lb (93.4 kg)   Height: 6' 2\" (1.88 m)     Physical Examination: General appearance - alert, well appearing, and in no distress  Eyes - pupils equal and reactive, extraocular eye movements intact  Ears - bilateral TM's and external ear canals normal  Nose - normal and patent, no erythema, discharge or polyps and normal nontender sinuses  Mouth - mucous membranes moist, pharynx normal without lesions  Neck - supple, no significant adenopathy  Chest - clear to auscultation, no wheezes, rales or rhonchi, symmetric air entry  Heart - normal rate, regular rhythm, normal S1, S2, no murmurs  Abdomen - tenderness noted in LLQ of abdomen with no visible or palpable abnormality present  bowel sounds normal  no CVA tenderness  Back exam - full range of motion, no tenderness, palpable spasm or pain on motion, negative straight-leg raise bilaterally, normal reflexes and strength bilateral lower extremities    Assessment/ Plan:   Author Ruben was seen today for hospital follow up. Diagnoses and all orders for this visit:    Acute low back pain, unspecified back pain laterality, with sciatica presence unspecified  -     Cancel: AMB POC URINALYSIS DIP STICK AUTO W/O MICRO  -     diclofenac EC (VOLTAREN) 75 mg EC tablet; Take 1 Tab by mouth two (2) times a day. Start diclofenac daily. Reviewed SEs/ADRs of medication. Enc to alternate heat and ice as needed for inflammation. Massage painful area to relieve muscle tension. OTC NSAID for pain/inflammation. Work on good body mechanics, avoid heavy lifting. RTC if sx persist or worsen. Chronic abdominal pain / Irritable bowel syndrome, unspecified type  -     REFERRAL TO GASTROENTEROLOGY  Persistent complaint unrelieved by abx prescribed in the past and other treatment measures. Enc pt to est care with GI for further evaluation. Follow-up Disposition:  Return if symptoms worsen or fail to improve. I have discussed the diagnosis with the patient and the intended plan as seen in the above orders.   The patient has received an after-visit summary and questions were answered concerning future plans. Medication Side Effects and Warnings were discussed with patient: yes  Patient Labs were reviewed and or requested: yes  Patient Past Records were reviewed and or requested  yes  Patient / Caregiver Understanding of treatment plan was verbalized during office visit YES    KANE Corey    There are no Patient Instructions on file for this visit.

## 2017-04-06 NOTE — MR AVS SNAPSHOT
Visit Information Date & Time Provider Department Dept. Phone Encounter #  
 4/6/2017  3:15 PM Pedro Luis Graf NP [facility] [phone] [id number] Follow-up Instructions Return if symptoms worsen or fail to improve. Upcoming Health Maintenance Date Due DTaP/Tdap/Td series (2 - Td) 4/6/2027 Allergies as of 4/6/2017  Review Complete On: 4/6/2017 By: Pedro Luis Graf NP Severity Noted Reaction Type Reactions Codeine  04/15/2014    Nausea and Vomiting Current Immunizations  Never Reviewed No immunizations on file. Not reviewed this visit You Were Diagnosed With   
  
 Codes Comments Acute low back pain, unspecified back pain laterality, with sciatica presence unspecified    -  Primary ICD-10-CM: M54.5 ICD-9-CM: 724.2 Chronic abdominal pain     ICD-10-CM: R10.9, G89.29 ICD-9-CM: 789.00, 338.29 Irritable bowel syndrome, unspecified type     ICD-10-CM: K58.9 ICD-9-CM: 318.6 Vitals BP Pulse Temp Resp Height(growth percentile) Weight(growth percentile) 108/74 (BP 1 Location: Right arm, BP Patient Position: Sitting) 72 98 °F (36.7 °C) (Oral) 18 6' 2\" (1.88 m) 206 lb (93.4 kg) SpO2 BMI Smoking Status 98% 26.45 kg/m2 Never Smoker Vitals History BMI and BSA Data Body Mass Index Body Surface Area  
 26.45 kg/m 2 2.21 m 2 Preferred Pharmacy Pharmacy Name Phone [address] [phone] Your Updated Medication List  
  
   
This list is accurate as of: 4/6/17  3:48 PM.  Always use your most recent med list.  
  
  
  
  
 diclofenac EC 75 mg EC tablet Commonly known as:  VOLTAREN Take 1 Tab by mouth two (2) times a day. Prescriptions Sent to Pharmacy  Refills  
 diclofenac EC (VOLTAREN) 75 mg EC tablet 2  
 Sig: Take 1 Tab by mouth two (2) times a day. Class: Normal  
 Pharmacy: [facility] [address] [facility] VALORIE Morel  #: [phone] Route: Oral  
  
We Performed the Following AMB POC URINALYSIS DIP STICK AUTO W/O MICRO [18012 CPT(R)] REFERRAL TO GASTROENTEROLOGY [ACR16 Custom] Follow-up Instructions Return if symptoms worsen or fail to improve. Referral Information Referral ID Referred By Referred To  
  
 [id number] Sana Larose [address] [facility], [place]   
   [address] [place] [address] [address] [place] [address] Phone: [phone] Fax: [phone] Visits Status Start Date End Date 1 New Request 4/6/17 4/6/18 If your referral has a status of pending review or denied, additional information will be sent to support the outcome of this decision. Introducing [facility] [place] & [facility]! [facility] introduces [employer] patient portal. Now you can access parts of your medical record, email your doctor's office, and request medication refills online. 1. In your internet browser, go to [email or web address] 2. Click on the First Time User? Click Here link in the Sign In box. You will see the New Member Sign Up page. 3. Enter your [employer] Access Code exactly as it appears below. You will not need to use this code after youve completed the sign-up process. If you do not sign up before the expiration date, you must request a new code. · [employer] Access Code: [email or web address] Expires: 4/20/2017 11:49 AM 
 
4. Enter the last four digits of your Social Security Number (xxxx) and Date of Birth (mm/dd/yyyy) as indicated and click Submit. You will be taken to the next sign-up page. 5. Create a [employer] E[employer] ID. This will be your [employer] login ID and cannot be changed, so think of one that is secure and easy to remember. 6. Create a Ali password. You can change your password at any time. 7. Enter your Password Reset Question and Answer. This can be used at a later time if you forget your password. 8. Enter your e-mail address. You will receive e-mail notification when new information is available in [address] 9. Click Sign Up. You can now view and download portions of your medical record. 10. Click the Download Summary menu link to download a portable copy of your medical information. If you have questions, please visit the Frequently Asked Questions section of the Ali website. Remember, Ali is NOT to be used for urgent needs. For medical emergencies, dial 911. Now available from your iPhone and Android! Please provide this summary of care documentation to your next provider. Your primary care clinician is listed as Karan Elizabeth. If you have any questions after today's visit, please call [phone].

## 2017-10-30 ENCOUNTER — OFFICE VISIT (OUTPATIENT)
Dept: FAMILY MEDICINE CLINIC | Age: 37
End: 2017-10-30

## 2017-10-30 VITALS
SYSTOLIC BLOOD PRESSURE: 129 MMHG | BODY MASS INDEX: 27.85 KG/M2 | WEIGHT: 217 LBS | RESPIRATION RATE: 18 BRPM | TEMPERATURE: 98.6 F | DIASTOLIC BLOOD PRESSURE: 90 MMHG | HEART RATE: 60 BPM | HEIGHT: 74 IN | OXYGEN SATURATION: 98 %

## 2017-10-30 DIAGNOSIS — R10.9 CHRONIC ABDOMINAL PAIN: Primary | ICD-10-CM

## 2017-10-30 DIAGNOSIS — G89.29 CHRONIC ABDOMINAL PAIN: Primary | ICD-10-CM

## 2017-10-30 DIAGNOSIS — Z13.29 SCREENING FOR THYROID DISORDER: ICD-10-CM

## 2017-10-30 DIAGNOSIS — Z13.220 LIPID SCREENING: ICD-10-CM

## 2017-10-30 NOTE — PROGRESS NOTES
Chief Complaint   Patient presents with    Abdominal Pain     Patient in office today for f/u on abdominal pain that has not improved since lov. Pt states he did follow up with gi specialist all labs and test results were normal.    1. Have you been to the ER, urgent care clinic since your last visit? Hospitalized since your last visit? No    2. Have you seen or consulted any other health care providers outside of the [address] [place] [address] since your last visit? Include any pap smears or colon screening.  No

## 2017-10-30 NOTE — PROGRESS NOTES
Chief Complaint   Patient presents with    Abdominal Pain     Patient in office today for f/u on abdominal pain that has not improved since lov. Pt states he did follow up with gi specialist all labs and test results were normal.    1. Have you been to the ER, urgent care clinic since your last visit? Hospitalized since your last visit? No    2. Have you seen or consulted any other health care providers outside of the [address] [place] [address] since your last visit? Include any pap smears or colon screening. No    Pt saw urology Dr. Jeniffer Ashton for this problem in the Spring. They re-evaluated his left inguinal hernia and found that there was no recurrence following repair surgery in 2015. There was a right kidney stone seen but no treatment was indicated. Pt saw Dr. Juli Hernandez in the summer for his sx. Had previously had normal colonoscopy and EGD for abd pain. H pylori testing was negative. Food allergy and other labs done at [facility] have been normal.  Dr. Lenna Canavan ordered repeat colonoscopy, normal.   CT of abdomen and pelvis was also normal.   Has not followed up since. Continues to have pain in the left lower abdomen after eating and at night. Associated reflux and heartburn. Pain has been the same since initial presentation. Bothers him daily multiple times a day. Not currently taking anything OTC for his sx. Denies any other concerns at this time. Chief Complaint   Patient presents with    Abdominal Pain     he is a 42y.o. year old male who presents for evalution. Reviewed PmHx, RxHx, FmHx, SocHx, AllgHx and updated and dated in the chart.     Review of Systems - negative except as listed above in the HPI    Objective:     Vitals:    10/30/17 1118   BP: 129/90   Pulse: 60   Resp: 18   Temp: 98.6 °F (37 °C)   TempSrc: Oral   SpO2: 98%   Weight: 217 lb (98.4 kg)   Height: 6' 2\" (1.88 m)     Physical Examination: General appearance - alert, well appearing, and in no distress  Eyes - pupils equal and reactive, extraocular eye movements intact  Ears - bilateral TM's and external ear canals normal  Nose - normal and patent, no erythema, discharge or polyps and normal nontender sinuses  Mouth - mucous membranes moist, pharynx normal without lesions  Neck - supple, no significant adenopathy  Chest - clear to auscultation, no wheezes, rales or rhonchi, symmetric air entry  Heart - normal rate, regular rhythm, normal S1, S2, no murmurs  Abdomen - soft, tenderness noted in LLQ of abdomen with no visible or palpable abnormality on exam, nondistended, no masses or organomegaly  bowel sounds normal  Extremities - peripheral pulses normal, no ankle edema, no clubbing or cyanosis  Skin - normal coloration and turgor, no rashes, no suspicious skin lesions noted    Assessment/ Plan:   Diagnoses and all orders for this visit:    1. Chronic abdominal pain  -     METABOLIC PANEL, COMPREHENSIVE  -     CBC WITH AUTOMATED DIFF  Enc pt to follow up with Dr. Cristiana Hernández for further evaluation due to persistent sx. 2. Lipid screening  -     LIPID PANEL  Will notify results and deviate plan based on findings. 3. Screening for thyroid disorder  -     TSH 3RD GENERATION  Screening, asx. Follow-up Disposition:  Return if symptoms worsen or fail to improve. I have discussed the diagnosis with the patient and the intended plan as seen in the above orders. The patient has received an after-visit summary and questions were answered concerning future plans.      Medication Side Effects and Warnings were discussed with patient: yes  Patient Labs were reviewed and or requested: yes  Patient Past Records were reviewed and or requested  yes  Patient / Caregiver Understanding of treatment plan was verbalized during office visit YES    KANE Mcgee    Patient Instructions   Dr. Salas Nose: [phone]

## 2017-10-30 NOTE — MR AVS SNAPSHOT
Visit Information Date & Time Provider Department Dept. Phone Encounter #  
 10/30/2017 11:30 AM Priya De Jesus NP [address] [facility] [phone] [id number] Follow-up Instructions Return if symptoms worsen or fail to improve. Upcoming Health Maintenance Date Due INFLUENZA AGE 9 TO ADULT 8/1/2017 DTaP/Tdap/Td series (2 - Td) 4/6/2027 Allergies as of 10/30/2017  Review Complete On: 10/30/2017 By: Priya De Jesus NP Severity Noted Reaction Type Reactions Codeine  04/15/2014    Nausea and Vomiting Current Immunizations  Never Reviewed No immunizations on file. Not reviewed this visit You Were Diagnosed With   
  
 Codes Comments Chronic abdominal pain    -  Primary ICD-10-CM: R10.9, G89.29 ICD-9-CM: 789.00, 338.29 Lipid screening     ICD-10-CM: F40.441 ICD-9-CM: V77.91 Screening for thyroid disorder     ICD-10-CM: Z13.29 ICD-9-CM: V77.0 Vitals BP Pulse Temp Resp Height(growth percentile) Weight(growth percentile) 129/90 (BP 1 Location: Right arm, BP Patient Position: Sitting) 60 98.6 °F (37 °C) (Oral) 18 6' 2\" (1.88 m) 217 lb (98.4 kg) SpO2 BMI Smoking Status 98% 27.86 kg/m2 Never Smoker Vitals History BMI and BSA Data Body Mass Index Body Surface Area  
 27.86 kg/m 2 2.27 m 2 Preferred Pharmacy Pharmacy Name Phone [address] [phone] Your Updated Medication List  
  
Notice  As of 10/30/2017 11:51 AM  
 You have not been prescribed any medications. We Performed the Following CBC WITH AUTOMATED DIFF [28251 CPT(R)] LIPID PANEL [48021 CPT(R)] METABOLIC PANEL, COMPREHENSIVE [85153 CPT(R)] TSH 3RD GENERATION [44571 CPT(R)] Follow-up Instructions Return if symptoms worsen or fail to improve. Patient Instructions Dr. Yaneth Moreau: [phone] Introducing \[facility]\"" & HEALTH SERVICES! Samjada Ricci introduces Back[employer]Agent patient portal. Now you can access parts of your medical record, email your doctor's office, and request medication refills online. 1. In your internet browser, go to [email or web address] 2. Click on the First Time User? Click Here link in the Sign In box. You will see the New Member Sign Up page. 3. Enter your Back[employer]Agent Access Code exactly as it appears below. You will not need to use this code after youve completed the sign-up process. If you do not sign up before the expiration date, you must request a new code. · Back[employer]Agent Access Code: [email or web address] Expires: 1/28/2018 11:51 AM 
 
4. Enter the last four digits of your Social Security Number (xxxx) and Date of Birth (mm/dd/yyyy) as indicated and click Submit. You will be taken to the next sign-up page. 5. Create a BackupAgent ID. This will be your BackupAgent login ID and cannot be changed, so think of one that is secure and easy to remember. 6. Create a Back[employer]Agent password. You can change your password at any time. 7. Enter your Password Reset Question and Answer. This can be used at a later time if you forget your password. 8. Enter your e-mail address. You will receive e-mail notification when new information is available in [address] 9. Click Sign Up. You can now view and download portions of your medical record. 10. Click the Download Summary menu link to download a portable copy of your medical information. If you have questions, please visit the Frequently Asked Questions section of the Back[employer]Agent website. Remember, Back[employer]Agent is NOT to be used for urgent needs. For medical emergencies, dial 911. Now available from your iPhone and Android! Please provide this summary of care documentation to your next provider. Your primary care clinician is listed as Sushma Benton.  If you have any questions after today's visit, please call [phone].

## 2017-10-31 DIAGNOSIS — E78.2 MIXED HYPERLIPIDEMIA: Primary | ICD-10-CM

## 2017-10-31 LAB
ALBUMIN SERPL-MCNC: 4.4 G/DL (ref 3.5–5.5)
ALBUMIN/GLOB SERPL: 1.6 {RATIO} (ref 1.2–2.2)
ALP SERPL-CCNC: 82 IU/L (ref 39–117)
ALT SERPL-CCNC: 23 IU/L (ref 0–44)
AST SERPL-CCNC: 16 IU/L (ref 0–40)
BASOPHILS # BLD AUTO: 0 X10E3/UL (ref 0–0.2)
BASOPHILS NFR BLD AUTO: 0 %
BILIRUB SERPL-MCNC: 0.5 MG/DL (ref 0–1.2)
BUN SERPL-MCNC: 13 MG/DL (ref 6–20)
BUN/CREAT SERPL: 14 (ref 9–20)
CALCIUM SERPL-MCNC: 9.3 MG/DL (ref 8.7–10.2)
CHLORIDE SERPL-SCNC: 102 MMOL/L (ref 96–106)
CHOLEST SERPL-MCNC: 242 MG/DL (ref 100–199)
CO2 SERPL-SCNC: 24 MMOL/L (ref 18–29)
CREAT SERPL-MCNC: 0.94 MG/DL (ref 0.76–1.27)
EOSINOPHIL # BLD AUTO: 0.3 X10E3/UL (ref 0–0.4)
EOSINOPHIL NFR BLD AUTO: 5 %
ERYTHROCYTE [DISTWIDTH] IN BLOOD BY AUTOMATED COUNT: 12.6 % (ref 12.3–15.4)
GFR SERPLBLD CREATININE-BSD FMLA CKD-EPI: 102 ML/MIN/1.73
GFR SERPLBLD CREATININE-BSD FMLA CKD-EPI: 118 ML/MIN/1.73
GLOBULIN SER CALC-MCNC: 2.7 G/DL (ref 1.5–4.5)
GLUCOSE SERPL-MCNC: 87 MG/DL (ref 65–99)
HCT VFR BLD AUTO: 43 % (ref 37.5–51)
HDLC SERPL-MCNC: 42 MG/DL
HGB BLD-MCNC: 15.2 G/DL (ref 12.6–17.7)
IMM GRANULOCYTES # BLD: 0 X10E3/UL (ref 0–0.1)
IMM GRANULOCYTES NFR BLD: 0 %
INTERPRETATION, 910389: NORMAL
LDLC SERPL CALC-MCNC: 156 MG/DL (ref 0–99)
LYMPHOCYTES # BLD AUTO: 1.4 X10E3/UL (ref 0.7–3.1)
LYMPHOCYTES NFR BLD AUTO: 28 %
MCH RBC QN AUTO: 29.5 PG (ref 26.6–33)
MCHC RBC AUTO-ENTMCNC: 35.3 G/DL (ref 31.5–35.7)
MCV RBC AUTO: 84 FL (ref 79–97)
MONOCYTES # BLD AUTO: 0.3 X10E3/UL (ref 0.1–0.9)
MONOCYTES NFR BLD AUTO: 6 %
NEUTROPHILS # BLD AUTO: 3 X10E3/UL (ref 1.4–7)
NEUTROPHILS NFR BLD AUTO: 61 %
PLATELET # BLD AUTO: 202 X10E3/UL (ref 150–379)
POTASSIUM SERPL-SCNC: 4.1 MMOL/L (ref 3.5–5.2)
PROT SERPL-MCNC: 7.1 G/DL (ref 6–8.5)
RBC # BLD AUTO: 5.15 X10E6/UL (ref 4.14–5.8)
SODIUM SERPL-SCNC: 142 MMOL/L (ref 134–144)
TRIGL SERPL-MCNC: 218 MG/DL (ref 0–149)
TSH SERPL DL<=0.005 MIU/L-ACNC: 2.62 UIU/ML (ref 0.45–4.5)
VLDLC SERPL CALC-MCNC: 44 MG/DL (ref 5–40)
WBC # BLD AUTO: 5.1 X10E3/UL (ref 3.4–10.8)

## 2017-10-31 RX ORDER — SIMVASTATIN 20 MG/1
20 TABLET, FILM COATED ORAL
Qty: 90 TAB | Refills: 1 | Status: SHIPPED | OUTPATIENT
Start: 2017-10-31 | End: 2018-05-29 | Stop reason: SDDI

## 2017-10-31 NOTE — PROGRESS NOTES
Please notify pt the followin. Cholesterol is very elevated. I think he should consider starting a low dose cholesterol medication. Rx has been sent to pharmacy on file. I also recommend he work on diet and lifestyle changes. Follow up in 3 months to repeat fasting labs. 2. Normal thyroid function. All other labs are normal. Sending letter with results and recs. Start zocor every evening. Repeat fasting labsin 3 months.

## 2018-03-08 ENCOUNTER — DOCUMENTATION ONLY (OUTPATIENT)
Dept: FAMILY MEDICINE CLINIC | Age: 38
End: 2018-03-08

## 2018-03-08 NOTE — PROGRESS NOTES
Faxed demographics, office notes,and labs to [facility] per request and pcp referral. Confirmation rec'd and scanned into media.

## 2018-05-29 ENCOUNTER — OFFICE VISIT (OUTPATIENT)
Dept: FAMILY MEDICINE CLINIC | Age: 38
End: 2018-05-29

## 2018-05-29 VITALS
OXYGEN SATURATION: 98 % | HEART RATE: 57 BPM | HEIGHT: 74 IN | WEIGHT: 214 LBS | DIASTOLIC BLOOD PRESSURE: 84 MMHG | TEMPERATURE: 98.5 F | SYSTOLIC BLOOD PRESSURE: 130 MMHG | RESPIRATION RATE: 18 BRPM | BODY MASS INDEX: 27.46 KG/M2

## 2018-05-29 DIAGNOSIS — E78.2 MIXED HYPERLIPIDEMIA: Primary | ICD-10-CM

## 2018-05-29 DIAGNOSIS — B35.3 TINEA PEDIS OF BOTH FEET: ICD-10-CM

## 2018-05-29 DIAGNOSIS — B35.1 ONYCHOMYCOSIS: ICD-10-CM

## 2018-05-29 RX ORDER — TERBINAFINE HYDROCHLORIDE 250 MG/1
TABLET ORAL
Qty: 30 TAB | Refills: 0 | Status: SHIPPED | OUTPATIENT
Start: 2018-05-29 | End: 2018-07-28 | Stop reason: SDUPTHER

## 2018-05-29 RX ORDER — CLOTRIMAZOLE AND BETAMETHASONE DIPROPIONATE 10; .64 MG/G; MG/G
CREAM TOPICAL
Qty: 15 G | Refills: 1 | Status: SHIPPED | OUTPATIENT
Start: 2018-05-29

## 2018-05-29 NOTE — PROGRESS NOTES
Chief Complaint   Patient presents with    Cholesterol Problem    Labs     Patient in office today for f/u and fasting labs. Pt have c/o of possible toenail fungus on both feet. Pt states possible fungus was noted 8 months ago. Have treated with otc anti fungal with no improvement noted. 1. Have you been to the ER, urgent care clinic since your last visit? Hospitalized since your last visit? No    2. Have you seen or consulted any other health care providers outside of the [address] [place] [address] since your last visit? Include any pap smears or colon screening.  No

## 2018-05-29 NOTE — PROGRESS NOTES
Chief Complaint   Patient presents with    Cholesterol Problem    Labs     Patient in office today for f/u and fasting labs. Pt have c/o of possible toenail fungus on both feet. Pt states possible fungus was noted 8 months ago. Have treated with otc anti fungal with no improvement noted. Has previously been on lamisil which helped. Lipids very elevated in October. Rx for zocor was started. Stopped taking when he ran out of medication a few months ago. Has not been following a heart healthy diet. Denies any cp, sob, and dyspnea. Chief Complaint   Patient presents with    Cholesterol Problem    Labs     he is a 42y.o. year old male who presents for evalution. Reviewed PmHx, RxHx, FmHx, SocHx, AllgHx and updated and dated in the chart. Review of Systems - negative except as listed above in the HPI    Objective:     Vitals:    05/29/18 0859   BP: 130/84   Pulse: (!) 57   Resp: 18   Temp: 98.5 °F (36.9 °C)   TempSrc: Oral   SpO2: 98%   Weight: 214 lb (97.1 kg)   Height: 6' 2\" (1.88 m)     Physical Examination: General appearance - alert, well appearing, and in no distress  Eyes - pupils equal and reactive, extraocular eye movements intact  Ears - bilateral TM's and external ear canals normal  Nose - normal and patent, no erythema, discharge or polyps and normal nontender sinuses  Mouth - mucous membranes moist, pharynx normal without lesions  Neck - supple, no significant adenopathy, carotids upstroke normal bilaterally, no bruits  Chest - clear to auscultation, no wheezes, rales or rhonchi, symmetric air entry  Heart - normal rate, regular rhythm, normal S1, S2, no murmurs  Extremities - peripheral pulses normal, no pedal edema, no clubbing or cyanosis  Skin - tinea pedis bilateral feet  NAILS: onychomycosis of the toenails    Assessment/ Plan:   Diagnoses and all orders for this visit:    1.  Mixed hyperlipidemia  -     LIPID PANEL  -     METABOLIC PANEL, COMPREHENSIVE  Will notify results and deviate plan based on findings. Did not stay on statin once he ran out of medication. Enc heart healthy diet. 2. Tinea pedis of both feet  -     clotrimazole-betamethasone (LOTRISONE) topical cream; Apply thin layer to bilateral feet daily as needed. Apply as directed. Reviewed preventive measures. 3. Onychomycosis  -     terbinafine HCl (LAMISIL) 250 mg tablet; TAKE 1 TABLET BY MOUTH DAILY  Take as directed. Reviewed SEs/ADRs of medication. Follow-up Disposition:  Return if symptoms worsen or fail to improve. I have discussed the diagnosis with the patient and the intended plan as seen in the above orders. The patient has received an after-visit summary and questions were answered concerning future plans. Medication Side Effects and Warnings were discussed with patient: yes  Patient Labs were reviewed and or requested: yes  Patient Past Records were reviewed and or requested  yes  Patient / Caregiver Understanding of treatment plan was verbalized during office visit YES    KANE Titus    There are no Patient Instructions on file for this visit.

## 2018-05-29 NOTE — MR AVS SNAPSHOT
[address] [address] 
[phone] Patient: Emery Garcia MRN: [id number] Luisa Munoz Visit Information Date & Time Provider Department Dept. Phone Encounter #  
 5/29/2018  8:45 AM Manolo Vora NP [facility] [phone] [id number] Follow-up Instructions Return if symptoms worsen or fail to improve. Upcoming Health Maintenance Date Due Influenza Age 5 to Adult 8/1/2018 DTaP/Tdap/Td series (2 - Td) 4/6/2027 Allergies as of 5/29/2018  Review Complete On: 5/29/2018 By: Manolo Vora NP Severity Noted Reaction Type Reactions Codeine  04/15/2014    Nausea and Vomiting Current Immunizations  Never Reviewed No immunizations on file. Not reviewed this visit You Were Diagnosed With   
  
 Codes Comments Mixed hyperlipidemia    -  Primary ICD-10-CM: S61.9 ICD-9-CM: 272.2 Tinea pedis of both feet     ICD-10-CM: B35.3 ICD-9-CM: 110.4 Onychomycosis     ICD-10-CM: B35.1 ICD-9-CM: 110.1 Vitals BP Pulse Temp Resp Height(growth percentile) Weight(growth percentile) 130/84 (BP 1 Location: Right arm, BP Patient Position: Sitting) (!) 57 98.5 °F (36.9 °C) (Oral) 18 6' 2\" (1.88 m) 214 lb (97.1 kg) SpO2 BMI Smoking Status 98% 27.48 kg/m2 Never Smoker Vitals History BMI and BSA Data Body Mass Index Body Surface Area  
 27.48 kg/m 2 2.25 m 2 Preferred Pharmacy Pharmacy Name Phone [address] [phone] Your Updated Medication List  
  
   
This list is accurate as of 5/29/18  9:13 AM.  Always use your most recent med list.  
  
  
  
  
 clotrimazole-betamethasone topical cream  
Commonly known as:  Martin Kussmasanaz Apply thin layer to bilateral feet daily as needed. terbinafine HCl 250 mg tablet Commonly known as:  LAMISIL TAKE 1 TABLET BY MOUTH DAILY Prescriptions Sent to Pharmacy Refills  
 terbinafine HCl (LAMISIL) 250 mg tablet 0 Sig: TAKE 1 TABLET BY MOUTH DAILY Class: Normal  
 Pharmacy: [facility] [address] [facility] [place] Ph #: [phone]  
 clotrimazole-betamethasone (LOTRISONE) topical cream 1 Sig: Apply thin layer to bilateral feet daily as needed. Class: Normal  
 Pharmacy: [facility] [address] [facility] [place] Ph #: [phone] We Performed the Following LIPID PANEL [21447 CPT(R)] METABOLIC PANEL, COMPREHENSIVE [09082 CPT(R)] Follow-up Instructions Return if symptoms worsen or fail to improve. Introducing [facility]! [employer] introduces [employer] patient portal. Now you can access parts of your medical record, email your doctor's office, and request medication refills online. 1. In your internet browser, go to [email or web address] 2. Click on the First Time User? Click Here link in the Sign In box. You will see the New Member Sign Up page. 3. Enter your [employer] Access Code exactly as it appears below. You will not need to use this code after youve completed the sign-up process. If you do not sign up before the expiration date, you must request a new code. · [employer] Access Code: [email or web address] Expires: 8/27/2018  9:13 AM 
 
4. Enter the last four digits of your Social Security Number (xxxx) and Date of Birth (mm/dd/yyyy) as indicated and click Submit. You will be taken to the next sign-up page. 5. Create a [employer] ID. This will be your [employer] login ID and cannot be changed, so think of one that is secure and easy to remember. 6. Create a [employer] password. You can change your password at any time. 7. Enter your Password Reset Question and Answer. This can be used at a later time if you forget your password. 8. Enter your e-mail address. You will receive e-mail notification when new information is available in [address] 9. Click Sign Up. You can now view and download portions of your medical record. 10. Click the Download Summary menu link to download a portable copy of your medical information. If you have questions, please visit the Frequently Asked Questions section of the [employer] website. Remember, [employer] is NOT to be used for urgent needs. For medical emergencies, dial 911. Now available from your iPhone and Android! Please provide this summary of care documentation to your next provider. Your primary care clinician is listed as Tony Dee. If you have any questions after today's visit, please call [phone].

## 2018-05-30 DIAGNOSIS — E78.1 HYPERTRIGLYCERIDEMIA: Primary | ICD-10-CM

## 2018-05-30 LAB
ALBUMIN SERPL-MCNC: 4.3 G/DL (ref 3.5–5.5)
ALBUMIN/GLOB SERPL: 2 {RATIO} (ref 1.2–2.2)
ALP SERPL-CCNC: 91 IU/L (ref 39–117)
ALT SERPL-CCNC: 17 IU/L (ref 0–44)
AST SERPL-CCNC: 16 IU/L (ref 0–40)
BILIRUB SERPL-MCNC: 0.4 MG/DL (ref 0–1.2)
BUN SERPL-MCNC: 14 MG/DL (ref 6–20)
BUN/CREAT SERPL: 16 (ref 9–20)
CALCIUM SERPL-MCNC: 9.2 MG/DL (ref 8.7–10.2)
CHLORIDE SERPL-SCNC: 104 MMOL/L (ref 96–106)
CHOLEST SERPL-MCNC: 207 MG/DL (ref 100–199)
CO2 SERPL-SCNC: 24 MMOL/L (ref 18–29)
CREAT SERPL-MCNC: 0.87 MG/DL (ref 0.76–1.27)
GFR SERPLBLD CREATININE-BSD FMLA CKD-EPI: 109 ML/MIN/1.73
GFR SERPLBLD CREATININE-BSD FMLA CKD-EPI: 126 ML/MIN/1.73
GLOBULIN SER CALC-MCNC: 2.2 G/DL (ref 1.5–4.5)
GLUCOSE SERPL-MCNC: 101 MG/DL (ref 65–99)
HDLC SERPL-MCNC: 36 MG/DL
INTERPRETATION, 910389: NORMAL
LDLC SERPL CALC-MCNC: ABNORMAL MG/DL (ref 0–99)
PDF IMAGE, 910387: NORMAL
POTASSIUM SERPL-SCNC: 4 MMOL/L (ref 3.5–5.2)
PROT SERPL-MCNC: 6.5 G/DL (ref 6–8.5)
SODIUM SERPL-SCNC: 140 MMOL/L (ref 134–144)
TRIGL SERPL-MCNC: 409 MG/DL (ref 0–149)
VLDLC SERPL CALC-MCNC: ABNORMAL MG/DL (ref 5–40)

## 2018-05-30 RX ORDER — FENOFIBRATE 145 MG/1
145 TABLET, COATED ORAL DAILY
Qty: 30 TAB | Refills: 2 | Status: SHIPPED | OUTPATIENT
Start: 2018-05-30

## 2018-05-31 NOTE — PROGRESS NOTES
Please notify pt the followin. Cholesterol is very abnormal. TGs are so elevated that the rest of his lipids could not be calculated. Therefore I would like to try a different cholesterol med called Tricor. New rx sent to pharmacy on file. Advise pt to take as directed. I recommend he follow up in 4-6 weeks to repeat fasting labs.

## 2018-07-12 ENCOUNTER — DOCUMENTATION ONLY (OUTPATIENT)
Dept: FAMILY MEDICINE CLINIC | Age: 38
End: 2018-07-12

## 2018-07-12 NOTE — PROGRESS NOTES
Faxed 05/29/18, 10/30/17 & 04/06/17 office notes/lab results to Dr Shweta Ghosh per Lacey request. Fax #[phone] confirmation received.

## 2018-07-28 DIAGNOSIS — B35.1 ONYCHOMYCOSIS: ICD-10-CM

## 2018-08-01 RX ORDER — TERBINAFINE HYDROCHLORIDE 250 MG/1
TABLET ORAL
Qty: 30 TAB | Refills: 0 | Status: SHIPPED | OUTPATIENT
Start: 2018-08-01

## 2019-02-14 ENCOUNTER — HOSPITAL ENCOUNTER (EMERGENCY)
Age: 39
Discharge: HOME | End: 2019-02-15
Attending: EMERGENCY MEDICINE
Payer: SELF-PAY

## 2019-02-14 DIAGNOSIS — R11.2 NAUSEA AND VOMITING, INTRACTABILITY OF VOMITING NOT SPECIFIED, UNSPECIFIED VOMITING TYPE: ICD-10-CM

## 2019-02-14 DIAGNOSIS — N20.0 KIDNEY STONE: Primary | ICD-10-CM

## 2019-02-14 LAB
COMMENT, HOLDF: NORMAL
SAMPLES BEING HELD,HOLD: NORMAL

## 2019-02-14 PROCEDURE — 99284 EMERGENCY DEPT VISIT MOD MDM: CPT

## 2019-02-14 PROCEDURE — 83690 ASSAY OF LIPASE: CPT

## 2019-02-14 PROCEDURE — 96361 HYDRATE IV INFUSION ADD-ON: CPT

## 2019-02-14 PROCEDURE — 96374 THER/PROPH/DIAG INJ IV PUSH: CPT

## 2019-02-14 PROCEDURE — 80053 COMPREHEN METABOLIC PANEL: CPT

## 2019-02-14 PROCEDURE — 85025 COMPLETE CBC W/AUTO DIFF WBC: CPT

## 2019-02-14 RX ORDER — ONDANSETRON 2 MG/ML
4 INJECTION INTRAMUSCULAR; INTRAVENOUS
Status: COMPLETED | OUTPATIENT
Start: 2019-02-14 | End: 2019-02-15

## 2019-02-14 RX ORDER — SODIUM CHLORIDE 9 MG/ML
100 INJECTION, SOLUTION INTRAVENOUS ONCE
Status: COMPLETED | OUTPATIENT
Start: 2019-02-14 | End: 2019-02-15

## 2019-02-15 ENCOUNTER — APPOINTMENT (OUTPATIENT)
Dept: ULTRASOUND IMAGING | Age: 39
End: 2019-02-15
Attending: NURSE PRACTITIONER
Payer: SELF-PAY

## 2019-02-15 VITALS
OXYGEN SATURATION: 96 % | DIASTOLIC BLOOD PRESSURE: 80 MMHG | HEIGHT: 74 IN | BODY MASS INDEX: 28.23 KG/M2 | SYSTOLIC BLOOD PRESSURE: 123 MMHG | RESPIRATION RATE: 16 BRPM | TEMPERATURE: 99.2 F | WEIGHT: 220 LBS | HEART RATE: 69 BPM

## 2019-02-15 LAB
ALBUMIN SERPL-MCNC: 4 G/DL (ref 3.5–5)
ALBUMIN/GLOB SERPL: 1.3 {RATIO} (ref 1.1–2.2)
ALP SERPL-CCNC: 101 U/L (ref 45–117)
ALT SERPL-CCNC: 25 U/L (ref 12–78)
ANION GAP SERPL CALC-SCNC: 7 MMOL/L (ref 5–15)
APPEARANCE UR: ABNORMAL
AST SERPL-CCNC: 22 U/L (ref 15–37)
BACTERIA URNS QL MICRO: NEGATIVE /HPF
BASOPHILS # BLD: 0 K/UL (ref 0–0.1)
BASOPHILS NFR BLD: 0 % (ref 0–1)
BILIRUB SERPL-MCNC: 0.4 MG/DL (ref 0.2–1)
BILIRUB UR QL: NEGATIVE
BUN SERPL-MCNC: 17 MG/DL (ref 6–20)
BUN/CREAT SERPL: 13 (ref 12–20)
CALCIUM SERPL-MCNC: 9.1 MG/DL (ref 8.5–10.1)
CHLORIDE SERPL-SCNC: 102 MMOL/L (ref 97–108)
CO2 SERPL-SCNC: 30 MMOL/L (ref 21–32)
COLOR UR: ABNORMAL
CREAT SERPL-MCNC: 1.34 MG/DL (ref 0.7–1.3)
DIFFERENTIAL METHOD BLD: ABNORMAL
EOSINOPHIL # BLD: 0 K/UL (ref 0–0.4)
EOSINOPHIL NFR BLD: 0 % (ref 0–7)
EPITH CASTS URNS QL MICRO: ABNORMAL /LPF
ERYTHROCYTE [DISTWIDTH] IN BLOOD BY AUTOMATED COUNT: 11.7 % (ref 11.5–14.5)
GLOBULIN SER CALC-MCNC: 3.2 G/DL (ref 2–4)
GLUCOSE SERPL-MCNC: 99 MG/DL (ref 65–100)
GLUCOSE UR STRIP.AUTO-MCNC: NEGATIVE MG/DL
HCT VFR BLD AUTO: 43.2 % (ref 36.6–50.3)
HGB BLD-MCNC: 14.5 G/DL (ref 12.1–17)
HGB UR QL STRIP: ABNORMAL
HYALINE CASTS URNS QL MICRO: ABNORMAL /LPF (ref 0–5)
IMM GRANULOCYTES # BLD AUTO: 0 K/UL (ref 0–0.04)
IMM GRANULOCYTES NFR BLD AUTO: 0 % (ref 0–0.5)
KETONES UR QL STRIP.AUTO: NEGATIVE MG/DL
LEUKOCYTE ESTERASE UR QL STRIP.AUTO: NEGATIVE
LIPASE SERPL-CCNC: 112 U/L (ref 73–393)
LYMPHOCYTES # BLD: 1.1 K/UL (ref 0.8–3.5)
LYMPHOCYTES NFR BLD: 13 % (ref 12–49)
MCH RBC QN AUTO: 28.2 PG (ref 26–34)
MCHC RBC AUTO-ENTMCNC: 33.6 G/DL (ref 30–36.5)
MCV RBC AUTO: 84 FL (ref 80–99)
MONOCYTES # BLD: 0.7 K/UL (ref 0–1)
MONOCYTES NFR BLD: 8 % (ref 5–13)
NEUTS SEG # BLD: 6.8 K/UL (ref 1.8–8)
NEUTS SEG NFR BLD: 79 % (ref 32–75)
NITRITE UR QL STRIP.AUTO: NEGATIVE
NRBC # BLD: 0 K/UL (ref 0–0.01)
NRBC BLD-RTO: 0 PER 100 WBC
PH UR STRIP: 8 [PH] (ref 5–8)
PLATELET # BLD AUTO: 224 K/UL (ref 150–400)
PMV BLD AUTO: 10.5 FL (ref 8.9–12.9)
POTASSIUM SERPL-SCNC: 4.2 MMOL/L (ref 3.5–5.1)
PROT SERPL-MCNC: 7.2 G/DL (ref 6.4–8.2)
PROT UR STRIP-MCNC: NEGATIVE MG/DL
RBC # BLD AUTO: 5.14 M/UL (ref 4.1–5.7)
RBC #/AREA URNS HPF: ABNORMAL /HPF (ref 0–5)
SODIUM SERPL-SCNC: 139 MMOL/L (ref 136–145)
SP GR UR REFRACTOMETRY: 1.01 (ref 1–1.03)
UROBILINOGEN UR QL STRIP.AUTO: 0.2 EU/DL (ref 0.2–1)
WBC # BLD AUTO: 8.6 K/UL (ref 4.1–11.1)
WBC URNS QL MICRO: ABNORMAL /HPF (ref 0–4)

## 2019-02-15 PROCEDURE — 81001 URINALYSIS AUTO W/SCOPE: CPT

## 2019-02-15 PROCEDURE — 76705 ECHO EXAM OF ABDOMEN: CPT

## 2019-02-15 PROCEDURE — 74011250636 HC RX REV CODE- 250/636: Performed by: NURSE PRACTITIONER

## 2019-02-15 RX ORDER — TAMSULOSIN HYDROCHLORIDE 0.4 MG/1
0.4 CAPSULE ORAL DAILY
Qty: 15 CAP | Refills: 0 | Status: SHIPPED | OUTPATIENT
Start: 2019-02-15 | End: 2019-03-02

## 2019-02-15 RX ORDER — OXYCODONE AND ACETAMINOPHEN 5; 325 MG/1; MG/1
1 TABLET ORAL
Qty: 20 TAB | Refills: 0 | Status: SHIPPED | OUTPATIENT
Start: 2019-02-15

## 2019-02-15 RX ORDER — ONDANSETRON 4 MG/1
4 TABLET, FILM COATED ORAL
Qty: 15 TAB | Refills: 0 | Status: SHIPPED | OUTPATIENT
Start: 2019-02-15

## 2019-02-15 RX ADMIN — SODIUM CHLORIDE 100 ML/HR: 900 INJECTION, SOLUTION INTRAVENOUS at 00:33

## 2019-02-15 RX ADMIN — ONDANSETRON 4 MG: 2 INJECTION INTRAMUSCULAR; INTRAVENOUS at 00:33

## 2019-02-15 NOTE — ED TRIAGE NOTES
[facility] of right sided abdominal pain. Patient reports abdominal pain with vomiting and chills since noon today.

## 2019-02-15 NOTE — ED PROVIDER NOTES
34 y.o male with past medical history significant for GERD present with chief complaint of right sided abdominal pain. Pt complains of RUQ abdominal pain that started around 12:00 PM today with associated hematuria, chills, and fever (T max 99.7 F per [facility] documentation). Pt states the pain is radiating to his right back. Pt also noted nausea and vomiting today that has since resolved. Pt states he was seen at [facility] today and sent to the ED to look for a kidney stone or gallbladder issue. Pt denies difficulty urinating. Denies any chest pain, shortness of breath or dizziness. There are no other acute complaints at this time. Past Medical History: 
No date: GERD (gastroesophageal reflux disease) Comment:  rarely Past Surgical History: 
No date: HX HERNIA REPAIR 
2000: HX ORTHOPAEDIC; Right Comment:  right wrist surgery Note written by Funmilayo Hawthorne, as dictated by Layla Ng NP 11:45 PM 
 
 
 
  
 
Past Medical History:  
Diagnosis Date  GERD (gastroesophageal reflux disease)   
 rarely Past Surgical History:  
Procedure Laterality Date  HX HERNIA REPAIR    
 HX ORTHOPAEDIC Right 2000  
 right wrist surgery No family history on file. Social History Socioeconomic History  Marital status:  Spouse name: Not on file  Number of children: Not on file  Years of education: Not on file  Highest education level: Not on file Social Needs  Financial resource strain: Not on file  Food insecurity - worry: Not on file  Food insecurity - inability: Not on file  Transportation needs - medical: Not on file  Transportation needs - non-medical: Not on file Occupational History  Not on file Tobacco Use  Smoking status: Never Smoker  Smokeless tobacco: Never Used Substance and Sexual Activity  Alcohol use: No  
 Drug use: No  
 Sexual activity: Yes Other Topics Concern  Not on file Social History Narrative  Not on file ALLERGIES: Codeine Review of Systems Constitutional: Positive for fever. Negative for activity change, appetite change, chills and fatigue. HENT: Negative for congestion, ear discharge, ear pain, sinus pressure, sinus pain, sore throat and trouble swallowing. Eyes: Negative for photophobia, pain, redness, itching and visual disturbance. Respiratory: Negative for chest tightness and shortness of breath. Cardiovascular: Negative for chest pain and palpitations. Gastrointestinal: Positive for abdominal pain, nausea and vomiting. Negative for abdominal distention. Endocrine: Negative. Genitourinary: Positive for hematuria. Negative for difficulty urinating, frequency and urgency. Musculoskeletal: Negative for back pain, neck pain and neck stiffness. Skin: Negative for color change, pallor, rash and wound. Allergic/Immunologic: Negative. Neurological: Negative for dizziness, syncope, weakness and headaches. Hematological: Does not bruise/bleed easily. Psychiatric/Behavioral: Negative for behavioral problems. The patient is not nervous/anxious. Vitals:  
 02/14/19 2124 02/14/19 2230 02/14/19 2245 02/14/19 2300 BP: (!) 164/99 150/89 136/83 (!) 147/96 Pulse: 69 Resp: 16 Temp: 99.2 °F (37.3 °C) SpO2: 100% 99% 97% 96% Weight: 99.8 kg (220 lb) Height: 6' 2\" (1.88 m) Physical Exam  
Constitutional: He is oriented to person, place, and time. He appears well-developed and well-nourished. No distress. HENT:  
Head: Normocephalic and atraumatic. Right Ear: External ear normal.  
Left Ear: External ear normal.  
Nose: Nose normal.  
Mouth/Throat: Oropharynx is clear and moist.  
Eyes: Conjunctivae and EOM are normal. Pupils are equal, round, and reactive to light. Right eye exhibits no discharge. Left eye exhibits no discharge. Neck: Normal range of motion. Neck supple. No JVD present.  No tracheal deviation present. Cardiovascular: Normal rate, regular rhythm, normal heart sounds and intact distal pulses. Exam reveals no gallop. No murmur heard. Pulmonary/Chest: Effort normal and breath sounds normal. No respiratory distress. He has no wheezes. He has no rales. He exhibits no tenderness. Abdominal: Soft. Bowel sounds are normal. He exhibits no distension. There is tenderness (right). There is no rebound and no guarding. Genitourinary: No penile tenderness. Genitourinary Comments: Negative per patient. Urine appears cloudy Musculoskeletal: Normal range of motion. He exhibits no edema or tenderness. Neurological: He is alert and oriented to person, place, and time. Skin: Skin is warm and dry. No rash noted. No erythema. No pallor. Psychiatric: He has a normal mood and affect. His behavior is normal. Judgment and thought content normal.  
Nursing note and vitals reviewed. MDM Number of Diagnoses or Management Options Kidney stone: new and requires workup Nausea and vomiting, intractability of vomiting not specified, unspecified vomiting type: new and requires workup Diagnosis management comments: Plan: 
Discharge to home and follow up with PCP. Follow up with urology. Return precautions reviewed including fever or increased pain. Patient and family in agreement with plan of care. Amount and/or Complexity of Data Reviewed Clinical lab tests: ordered and reviewed Tests in the radiology section of CPT®: ordered and reviewed Discuss the patient with other providers: yes (Discussed plan of care with Dr. Lenard Browne.) 
 
 
  
1:07 AM 
Pt has been reexamined. Pt has no new complaints, changes or physical findings. Care plan outlined and precautions discussed. All available results were reviewed with pt. All medications were reviewed with pt. All of pt's questions and concerns were addressed.  Pt agrees to F/U as instructed and agrees to return to ED upon further deterioration. Pt is ready to go home. Chuy Felix NP Procedures

## 2019-02-15 NOTE — DISCHARGE INSTRUCTIONS
Patient Education        Kidney Stone: Care Instructions  Your Care Instructions    Kidney stones are formed when salts, minerals, and other substances normally found in the urine clump together. They can be as small as grains of sand or, rarely, as large as golf balls. While the stone is traveling through the ureter, which is the tube that carries urine from the kidney to the bladder, you will probably feel pain. The pain may be mild or very severe. You may also have some blood in your urine. As soon as the stone reaches the bladder, any intense pain should go away. If a stone is too large to pass on its own, you may need a medical procedure to help you pass the stone. The doctor has checked you carefully, but problems can develop later. If you notice any problems or new symptoms, get medical treatment right away. Follow-up care is a key part of your treatment and safety. Be sure to make and go to all appointments, and call your doctor if you are having problems. It's also a good idea to know your test results and keep a list of the medicines you take. How can you care for yourself at home? · Drink plenty of fluids, enough so that your urine is light yellow or clear like water. If you have kidney, heart, or liver disease and have to limit fluids, talk with your doctor before you increase the amount of fluids you drink. · Take pain medicines exactly as directed. Call your doctor if you think you are having a problem with your medicine. ? If the doctor gave you a prescription medicine for pain, take it as prescribed. ? If you are not taking a prescription pain medicine, ask your doctor if you can take an over-the-counter medicine. Read and follow all instructions on the label. · Your doctor may ask you to strain your urine so that you can collect your kidney stone when it passes. You can use a kitchen strainer or a tea strainer to catch the stone.  Store it in a plastic bag until you see your doctor again.  Preventing future kidney stones  Some changes in your diet may help prevent kidney stones. Depending on the cause of your stones, your doctor may recommend that you:  · Drink plenty of fluids, enough so that your urine is light yellow or clear like water. If you have kidney, heart, or liver disease and have to limit fluids, talk with your doctor before you increase the amount of fluids you drink. · Limit coffee, tea, and alcohol. Also avoid grapefruit juice. · Do not take more than the recommended daily dose of vitamins C and D.  · Avoid antacids such as Gaviscon, Maalox, Mylanta, or Tums. · Limit the amount of salt (sodium) in your diet. · Eat a balanced diet that is not too high in protein. · Limit foods that are high in a substance called oxalate, which can cause kidney stones. These foods include dark green vegetables, rhubarb, chocolate, wheat bran, nuts, cranberries, and beans. When should you call for help? Call your doctor now or seek immediate medical care if:    · You cannot keep down fluids.     · Your pain gets worse.     · You have a fever or chills.     · You have new or worse pain in your back just below your rib cage (the flank area).     · You have new or more blood in your urine.    Watch closely for changes in your health, and be sure to contact your doctor if:    · You do not get better as expected. Where can you learn more? Go to http://suma-huong.info/. Enter T889 in the search box to learn more about \"Kidney Stone: Care Instructions. \"  Current as of: March 14, 2018  Content Version: 11.9  © 0709-1016 [employer]. Care instructions adapted under license by [employer] (which disclaims liability or warranty for this information).  If you have questions about a medical condition or this instruction, always ask your healthcare professional. [address] any warranty or liability for your use of this information.

## 2022-03-19 PROBLEM — E78.2 MIXED HYPERLIPIDEMIA: Status: ACTIVE | Noted: 2017-10-31

## 2024-05-13 ENCOUNTER — HOSPITAL ENCOUNTER (EMERGENCY)
Facility: HOSPITAL | Age: 44
Discharge: HOME | End: 2024-05-13
Attending: STUDENT IN AN ORGANIZED HEALTH CARE EDUCATION/TRAINING PROGRAM

## 2024-05-13 VITALS
SYSTOLIC BLOOD PRESSURE: 153 MMHG | TEMPERATURE: 98.2 F | HEIGHT: 74 IN | OXYGEN SATURATION: 99 % | BODY MASS INDEX: 29.52 KG/M2 | RESPIRATION RATE: 14 BRPM | DIASTOLIC BLOOD PRESSURE: 103 MMHG | HEART RATE: 97 BPM | WEIGHT: 230 LBS

## 2024-05-13 DIAGNOSIS — S01.81XA FACIAL LACERATION, INITIAL ENCOUNTER: Primary | ICD-10-CM

## 2024-05-13 PROCEDURE — 6370000000 HC RX 637 (ALT 250 FOR IP): Performed by: STUDENT IN AN ORGANIZED HEALTH CARE EDUCATION/TRAINING PROGRAM

## 2024-05-13 PROCEDURE — 12011 RPR F/E/E/N/L/M 2.5 CM/<: CPT

## 2024-05-13 PROCEDURE — 99283 EMERGENCY DEPT VISIT LOW MDM: CPT

## 2024-05-13 RX ADMIN — Medication 3 ML: at 21:15

## 2024-05-13 ASSESSMENT — PAIN - FUNCTIONAL ASSESSMENT: PAIN_FUNCTIONAL_ASSESSMENT: 0-10

## 2024-05-13 ASSESSMENT — PAIN SCALES - GENERAL: PAINLEVEL_OUTOF10: 0

## 2024-05-14 ASSESSMENT — ENCOUNTER SYMPTOMS
EYE DISCHARGE: 0
SHORTNESS OF BREATH: 0
ABDOMINAL PAIN: 0

## 2024-05-15 NOTE — ED PROVIDER NOTES
display    All other labs were within normal range or not returned as of this dictation.    EMERGENCY DEPARTMENT COURSE and DIFFERENTIAL DIAGNOSIS/MDM:   Vitals:    Vitals:    05/13/24 2052 05/13/24 2101   BP: (!) 153/103    Pulse: 68 97   Resp: 14    Temp: 98.2 °F (36.8 °C)    TempSrc: Oral    SpO2: 99%    Weight: 104.3 kg (230 lb)    Height: 1.88 m (6' 2\")        Medical Decision Making  Risk  Prescription drug management.    Patient is seen today for laceration to the chin.  No foreign bodies noted in the wound bed.  Neurovascularly intact.    Considered imaging of the max face however no concern for fracture.     Tetanus was up-to-date.     See below for procedure note.  Patient given follow-up instructions for wound care.  Recommend Motrin, Tylenol OTC.  Did give patient specific return precautions should wound begin to look infected.    Lac Repair    Date/Time: 5/14/2024 9:34 PM    Performed by: Lyndsey Fitch PA-C  Authorized by: Jaspreet Putnam DO    Consent:     Consent obtained:  Verbal    Consent given by:  Patient    Risks, benefits, and alternatives were discussed: yes      Risks discussed:  Infection, need for additional repair, poor cosmetic result and poor wound healing    Alternatives discussed:  No treatment  Universal protocol:     Patient identity confirmed:  Verbally with patient  Anesthesia:     Anesthesia method:  Local infiltration and topical application    Topical anesthetic:  LET    Local anesthetic:  Lidocaine 1% w/o epi  Laceration details:     Location:  Face    Face location:  Chin    Length (cm):  2    Depth (mm):  10  Pre-procedure details:     Preparation:  Patient was prepped and draped in usual sterile fashion  Exploration:     Hemostasis achieved with:  Direct pressure    Imaging outcome: foreign body not noted      Wound exploration: entire depth of wound visualized      Contaminated: no    Treatment:     Area cleansed with:  Chlorhexidine    Amount of cleaning:  Standard